# Patient Record
Sex: FEMALE | Race: WHITE | Employment: PART TIME | URBAN - METROPOLITAN AREA
[De-identification: names, ages, dates, MRNs, and addresses within clinical notes are randomized per-mention and may not be internally consistent; named-entity substitution may affect disease eponyms.]

---

## 2017-01-07 PROBLEM — N92.0 MENORRHAGIA WITH REGULAR CYCLE: Status: ACTIVE | Noted: 2017-01-07

## 2017-03-06 ENCOUNTER — OFFICE VISIT (OUTPATIENT)
Dept: FAMILY MEDICINE | Facility: CLINIC | Age: 42
End: 2017-03-06
Payer: COMMERCIAL

## 2017-03-06 VITALS
BODY MASS INDEX: 23.14 KG/M2 | HEIGHT: 66 IN | TEMPERATURE: 98.2 F | DIASTOLIC BLOOD PRESSURE: 64 MMHG | OXYGEN SATURATION: 100 % | HEART RATE: 64 BPM | SYSTOLIC BLOOD PRESSURE: 112 MMHG | WEIGHT: 144 LBS | RESPIRATION RATE: 10 BRPM

## 2017-03-06 DIAGNOSIS — J45.20 INTERMITTENT ASTHMA, UNCOMPLICATED: ICD-10-CM

## 2017-03-06 DIAGNOSIS — R10.2 PELVIC PAIN IN FEMALE: ICD-10-CM

## 2017-03-06 DIAGNOSIS — R39.89 URINARY PROBLEM: Primary | ICD-10-CM

## 2017-03-06 DIAGNOSIS — K62.89 RECTAL PAIN: ICD-10-CM

## 2017-03-06 LAB
ALBUMIN UR-MCNC: NEGATIVE MG/DL
APPEARANCE UR: CLEAR
BILIRUB UR QL STRIP: NEGATIVE
COLOR UR AUTO: YELLOW
GLUCOSE UR STRIP-MCNC: NEGATIVE MG/DL
HGB UR QL STRIP: NEGATIVE
KETONES UR STRIP-MCNC: NEGATIVE MG/DL
LEUKOCYTE ESTERASE UR QL STRIP: NEGATIVE
MICRO REPORT STATUS: NORMAL
NITRATE UR QL: NEGATIVE
PH UR STRIP: 6 PH (ref 5–7)
SP GR UR STRIP: 1.01 (ref 1–1.03)
SPECIMEN SOURCE: NORMAL
URN SPEC COLLECT METH UR: NORMAL
UROBILINOGEN UR STRIP-ACNC: 0.2 EU/DL (ref 0.2–1)
WET PREP SPEC: NORMAL

## 2017-03-06 PROCEDURE — 81003 URINALYSIS AUTO W/O SCOPE: CPT | Performed by: NURSE PRACTITIONER

## 2017-03-06 PROCEDURE — 87210 SMEAR WET MOUNT SALINE/INK: CPT | Performed by: NURSE PRACTITIONER

## 2017-03-06 PROCEDURE — 99214 OFFICE O/P EST MOD 30 MIN: CPT | Performed by: NURSE PRACTITIONER

## 2017-03-06 NOTE — MR AVS SNAPSHOT
After Visit Summary   3/6/2017    Janelle Alston    MRN: [id number]           Patient Information     Date Of Birth          1975        Visit Information        Provider Department      3/6/2017 11:00 AM Haase, Susan Rachele, APRN CNP [facility]        Today's Diagnoses     Urinary problem    -  1    Pelvic pain in female        Rectal pain        Intermittent asthma, uncomplicated           Follow-ups after your visit        Follow-up notes from your care team     Return if symptoms worsen or fail to improve.      Future tests that were ordered for you today     Open Future Orders        Priority Expected Expires Ordered    US Pelvic Complete w Transvaginal Routine 6/4/2017 3/6/2018 3/6/2017            Who to contact     If you have questions or need follow up information about today's clinic visit or your schedule please contact [facility] directly at [phone].  Normal or non-critical lab and imaging results will be communicated to you by [employer]Xtreme[employer], letter or phone within 4 business days after the clinic has received the results. If you do not hear from us within 7 days, please contact the clinic through ScaleXtremehart or phone. If you have a critical or abnormal lab result, we will notify you by phone as soon as possible.  Submit refill requests through [employer] or call your pharmacy and they will forward the refill request to us. Please allow 3 business days for your refill to be completed.          Additional Information About Your Visit        MyChart Information     [employer] gives you secure access to your electronic health record. If you see a primary care provider, you can also send messages to your care team and make appointments. If you have questions, please call your primary care clinic.  If you do not have a primary care provider, please call [phone] and they will assist you.        Care EveryWhere ID     This is your Care EveryWhere ID. This could  "be used by other organizations to access your [place] medical records  [id number]        Your Vitals Were     Pulse Temperature Respirations Height Pulse Oximetry BMI (Body Mass Index)    64 98.2  F (36.8  C) (Oral) 10 5' 6\" (1.676 m) 100% 23.24 kg/m2       Blood Pressure from Last 3 Encounters:   03/06/17 112/64   12/20/16 120/66   12/16/16 102/68    Weight from Last 3 Encounters:   03/06/17 144 lb (65.3 kg)   12/16/16 139 lb (63 kg)   12/15/16 141 lb 9.6 oz (64.2 kg)              We Performed the Following     *UA reflex to Microscopic and Culture ([facility], [place] and [facility] (except [place] and [place])     Asthma Action Plan (AAP)     Wet prep        Primary Care Provider Office Phone # Fax #    Ramona Ann Aaseby-Aguilera, PA-C [phone]       [facility] [address] [address] [facility] [address]        Thank you!     Thank you for choosing [facility]  for your care. Our goal is always to provide you with excellent care. Hearing back from our patients is one way we can continue to improve our services. Please take a few minutes to complete the written survey that you may receive in the mail after your visit with us. Thank you!             Your Updated Medication List - Protect others around you: Learn how to safely use, store and throw away your medicines at www.disposemymeds.org.          This list is accurate as of: 3/6/17 11:40 AM.  Always use your most recent med list.                   Brand Name Dispense Instructions for use    acidophilus Caps      2 tabs per day of an UNK dose       ALBUTEROL INHALER 17GM      PRN       ALLEGRA 180 MG tablet   Generic drug:  fexofenadine      Take by mouth daily Twice a day       B Complex Vitamins Soln          calcium carbonate 500 MG tablet    OS- mg [place]. Ca     2 tab in the am       cholecalciferol 1000 UNIT tablet    vitamin D     1 TABLET DAILY or PRN       coenzyme Q-10 100 MG Caps     30 Cap    " 2 tab at night       * EPINEPHrine 0.3 MG/0.3ML injection    EPIPEN 2-ANNEMARIE    1 each    Inject 0.3 mLs into the muscle once as needed for anaphylaxis for 1 dose.       * EPINEPHrine 0.3 MG/0.3ML injection    EPIPEN 2-ANNEMARIE    0.6 mL    Inject 0.3 mLs (0.3 mg) into the muscle once as needed for anaphylaxis       fluticasone 50 MCG/ACT spray    FLONASE    16 g    Spray 1-2 sprays into both nostrils daily       IRON SUPPLEMENT PO      Take 325 mg by mouth daily       Magnesium 400 MG Caps      Take 2 tablets by mouth.       Multi-vitamin Tabs tablet   Generic drug:  multivitamin, therapeutic with minerals      ONE TABLET DAILY x 2x per day       omega 3 1000 MG Caps      3 CAPSULES DAILY WITH A MEAL       oxyCODONE 5 MG IR tablet    ROXICODONE    18 tablet    Take 1 tablet (5 mg) by mouth every 4 hours as needed for pain maximum 6 tablet(s) per day       vitamin C 100 MG Chew      1 TABLET 3 TIMES DAILY       * Notice:  This list has 2 medication(s) that are the same as other medications prescribed for you. Read the directions carefully, and ask your doctor or other care provider to review them with you.

## 2017-03-06 NOTE — NURSING NOTE
"Chief Complaint   Patient presents with     Urinary Problem       Initial /64 (BP Location: Right arm, Patient Position: Chair, Cuff Size: Adult Regular)  Pulse 64  Temp 98.2  F (36.8  C) (Oral)  Resp 10  Ht 5' 6\" (1.676 m)  Wt 144 lb (65.3 kg)  SpO2 100%  BMI 23.24 kg/m2 Estimated body mass index is 23.24 kg/(m^2) as calculated from the following:    Height as of this encounter: 5' 6\" (1.676 m).    Weight as of this encounter: 144 lb (65.3 kg).  Medication Reconciliation: complete   Marleni Allen CMA      "

## 2017-03-06 NOTE — PROGRESS NOTES
SUBJECTIVE:                                                    Janelle Alston is a 41 year old female who presents to clinic today for the following health issues:    URINARY TRACT SYMPTOMS      Duration: 2 weeks    Description  frequency    Accompanying signs and symptoms:  Fever/chills: no  Flank pain no   Nausea and vomiting: YES  Vaginal symptoms: discharge and itching  Abdominal/Pelvic Pain: YES    History  History of frequent UTI's: YES  History of kidney stones: no   Sexually Active: YES  Possibility of pregnancy: No    Precipitating or alleviating factors: None    Therapies tried and outcome: ibuprofen       History of ablation, no vaginal bleeding.      Janelle reports rectal pain w/ burning sensation and severe discomfort. Rectal pain is constant during the day and worse when standing. Symptoms are improved when asleep. She recently has a massage which provided some relief. Has a hx of internal hemorrhoids and removal. Has used lavender and coconut oil for relief. Denies hematochezia, rectal itching, constipation or abdominal pain.     Has colorectal appt in 2 weeks.    Problem list and histories reviewed & adjusted, as indicated.  Additional history: as documented    Patient Active Problem List   Diagnosis     Intermittent asthma     CARDIOVASCULAR SCREENING; LDL GOAL LESS THAN 160     LLQ abdominal pain     Hepatic adenoma     Allergic rhinitis     Menorrhagia with regular cycle     Past Surgical History   Procedure Laterality Date     C  delivery only        and      Surgical history of -        laproscopy -abd-for endometriosis     Hc tooth extraction w/forcep       Abdominoplasty       Dilation and curettage, hysteroscopy, ablate endometrium novasure, combined N/A 2016     Procedure: COMBINED DILATION AND CURETTAGE, HYSTEROSCOPY, ABLATE ENDOMETRIUM NOVASURE;  Surgeon: Isak Eaton MD;  Location:  OR       Social History   Substance Use Topics     Smoking status:  Never Smoker     Smokeless tobacco: Never Used     Alcohol use No     Family History   Problem Relation Age of Onset     Family History Negative Mother      Family History Negative Father      DIABETES Other      paternal greatgrandmother     Family History Negative Brother      1     CANCER Maternal Grandmother      ? kidney cancer         Current Outpatient Prescriptions   Medication Sig Dispense Refill     EPINEPHrine (EPIPEN 2-ANNEMARIE) 0.3 MG/0.3ML injection Inject 0.3 mLs (0.3 mg) into the muscle once as needed for anaphylaxis 0.6 mL 1     Ferrous Sulfate (IRON SUPPLEMENT PO) Take 325 mg by mouth daily       B Complex Vitamins SOLN        fluticasone (FLONASE) 50 MCG/ACT nasal spray Spray 1-2 sprays into both nostrils daily 16 g 11     fexofenadine (ALLEGRA) 180 MG tablet Take by mouth daily Twice a day       Magnesium 400 MG CAPS Take 2 tablets by mouth.       EPINEPHrine (EPIPEN 2-ANNEMARIE) 0.3 MG/0.3ML injection Inject 0.3 mLs into the muscle once as needed for anaphylaxis for 1 dose. 1 each 0     MULTI-VITAMIN PO TABS ONE TABLET DAILY x 2x per day       VITAMIN C 100 MG PO CHEW 1 TABLET 3 TIMES DAILY       VITAMIN D 1000 UNIT PO TABS 1 TABLET DAILY or PRN       CALCIUM 500 MG PO TABS 2 tab in the am       CO Q-10 100 MG PO CAPS 2 tab at night 30 Cap      ACIDOPHILUS OR CAPS 2 tabs per day of an UNK dose       OMEGA 3 1000 MG PO CAPS 3 CAPSULES DAILY WITH A MEAL       ALBUTEROL INHALER 17GM PRN       oxyCODONE (ROXICODONE) 5 MG IR tablet Take 1 tablet (5 mg) by mouth every 4 hours as needed for pain maximum 6 tablet(s) per day (Patient not taking: Reported on 3/6/2017) 18 tablet 0     Allergies   Allergen Reactions     Amoxicillin Rash     Ciproflox [Ciprofloxacin] Nausea and Vomiting     Clindamycin Rash     Erythromycin      rash     Penicillins Rash     Sulfa Drugs Rash     Tetracycline Rash     Tylenol [Acetaminophen] Itching       Reviewed and updated as needed this visit by clinical staff       Reviewed and  "updated as needed this visit by Provider         ROS:  Constitutional, HEENT, cardiovascular, pulmonary, GI, , musculoskeletal, neuro, skin, endocrine and psych systems are negative, except as otherwise noted.    This document serves as a record of the services and decisions personally performed and made by Susan Haase, CNP. It was created on her behalf by Angeli Domínguez, a trained medical scribe. The creation of this document is based the provider's statements to the medical scribe.  Angeli Domínguez March 6, 2017 11:26 AM     OBJECTIVE:                                                    /64 (BP Location: Right arm, Patient Position: Chair, Cuff Size: Adult Regular)  Pulse 64  Temp 98.2  F (36.8  C) (Oral)  Resp 10  Ht 1.676 m (5' 6\")  Wt 65.3 kg (144 lb)  SpO2 100%  BMI 23.24 kg/m2  Body mass index is 23.24 kg/(m^2).  GENERAL: healthy, alert and no distress  RESP: lungs clear to auscultation - no rales, rhonchi or wheezes  CV: regular rate and rhythm, normal S1 S2, no S3 or S4, no murmur, click or rub, no peripheral edema   ABDOMEN: soft, nontender, no hepatosplenomegaly, no masses and bowel sounds normal  RECTUM:  Rectum without external hemorrhoid or erythema/edema, small skin tag, tender to touch.  Internal exam deferred.   PSYCH: mentation appears normal, affect normal/bright  Results for orders placed or performed in visit on 03/06/17   *UA reflex to Microscopic and Culture ([facility] (except [place] and [place])   Result Value Ref Range    Color Urine Yellow     Appearance Urine Clear     Glucose Urine Negative NEG mg/dL    Bilirubin Urine Negative NEG    Ketones Urine Negative NEG mg/dL    Specific Gravity Urine 1.015 1.003 - 1.035    Blood Urine Negative NEG    pH Urine 6.0 5.0 - 7.0 pH    Protein Albumin Urine Negative NEG mg/dL    Urobilinogen Urine 0.2 0.2 - 1.0 EU/dL    Nitrite Urine Negative NEG    Leukocyte Esterase Urine Negative NEG    Source Midstream " Urine    Wet prep   Result Value Ref Range    Specimen Description Vagina     Wet Prep       No Trichomonas seen  No clue cells seen  No yeast seen      Micro Report Status FINAL 03/06/2017         ASSESSMENT/PLAN:                                                    Janelle was seen today for urinary problem.    Diagnoses and all orders for this visit:    Urinary problem:  UA and wet prep with normal results, see above, patient informed.   -     *UA reflex to Microscopic and Culture ([facility], [place] and [facility] (except [place] and [place])  -     Wet prep    Pelvic pain in female  -     US Pelvic Complete w Transvaginal; Future    Rectal pain: external exam is normal, has an appt with colo rectal in 2 weeks.      The information in this document, created by the medical scribe for me, accurately reflects the services I personally performed and the decisions made by me. I have reviewed and approved this document for accuracy.   Susan Haase, CNP   Follow up as needed if symptoms get worse or do not improve.   Susan Haase, APRN CNP  [facility]

## 2017-12-12 ENCOUNTER — THERAPY VISIT (OUTPATIENT)
Dept: PHYSICAL THERAPY | Facility: CLINIC | Age: 42
End: 2017-12-12
Payer: COMMERCIAL

## 2017-12-12 DIAGNOSIS — M54.50 LUMBAGO: ICD-10-CM

## 2017-12-12 DIAGNOSIS — R10.2 PELVIC PAIN IN FEMALE: Primary | ICD-10-CM

## 2017-12-12 PROCEDURE — 97535 SELF CARE MNGMENT TRAINING: CPT | Mod: GP | Performed by: PHYSICAL THERAPIST

## 2017-12-12 PROCEDURE — 97161 PT EVAL LOW COMPLEX 20 MIN: CPT | Mod: GP | Performed by: PHYSICAL THERAPIST

## 2017-12-12 PROCEDURE — 97110 THERAPEUTIC EXERCISES: CPT | Mod: GP | Performed by: PHYSICAL THERAPIST

## 2017-12-12 NOTE — LETTER
"[facility]  [address]  [address]  [place] [facility] [address]  [phone]    2017    Re: Janelle Alston   :   1975  MRN:  [id number]   REFERRING PHYSICIAN:   Nelson MARIO PT    Date of Initial Evaluation:  2017  Visits:  Rxs Used: 1  Reason for Referral:     Pelvic pain in female  Lumbago    EVALUATION SUMMARY    Subjective:    Patient is a 42 year old female presenting with rehab pelvic hpi. The history is provided by the patient. No  was used.   Janelle Alston is a 42 year old female with a pelvic dysfunction (LBP) condition.  Condition occurred with:  Insidious onset.  Condition occurred: for unknown reasons.  This is a chronic condition  Onset of pelvic floor dysfunction ~ 1-2 years ago. Noticed had urgency/frequency/difficulty urinating/ incomplete emptying/painful intercourse and tampon use. Developed anal fissure as well (8 mos ago). , 2 c-sections (12 years ago). Up at night occasionally to void but able to ignore. Also able to ignore urge during day for the most part. Had severe pain with periods/tampon use so had ablation Dec 2016. Pain in rectal/perineum from fissure. Uses heating pad after BM b/c pain occurs in fissure area about 1 hour after BM. Denies any pain with BM or hard stool. [place] to  and upper abdominal also helps. Goal is to get rid of pain, and urgency, have intercourse with less pain.     LBP across  For 10+ year history. Sees chiropractor regularly. 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 MRI recently showed L4-5 annual tear and L5-S1 DDD. Feels really tight. WORSE with walking, bending.  BETTER with laying on back, foam roller, chiro, traction, Graston..    Patient reports pain:  Lumbar spine right, lumbar spine left, vaginal and coccyx.    Pain is described as aching and sharp and is constant and intermittent and reported as 4/10.        Since onset symptoms are gradually " worsening.        General health as reported by patient is excellent.  Pertinent medical history includes:  Asthma.  Medical allergies: yes (see EPIC).  Other surgeries include:  None.  Current medications:  Anti-inflammatory and pain medication.  Current occupation is /seamstress/dance.      Objective:  Standing Alignment:    Lumbar:  Lordosis decr  Pelvic:  PSIS high L  General deviations alignment: L anterior innominate rotation in supine, leg length appears = B in supine and long sit.     Gait:  Slight R lateral shift with gait        Re: Janelle Alston   :   1975       Lumbar/SI Evaluation  ROM:    AROM Lumbar:   Flexion:        To mid-thigh, max loss flexion, uses hips to hinge and hands to return to neutral.  Ext:                    Max loss, central LBP   Side Bend:        Left:     Right:   Rotation:           Left:     Right:   Side Glide:        Left:     Right:     Strength: Prone press-ups decreased central LBP after 3 reps.   Lumbar Myotomes:  normal  Lumbar Dermtomes:  not assessed  Neural Tension/Mobility:  Lumbar:  Normal    Lumbar Palpation:    Tenderness present at Left:    Quadratus Lumborum and Erector Spinae  Tenderness present at Right: Quadratus Lumborum and Erector Spinae  SI joint/Sacrum:      Left negative at:    Squish  Right negative at:  Squish  Sacral conclusion left:  Anterior inominate  Sacral conclusion right:  Posterior inominate       Pelvic Dysfunction Evaluation:    Bladder/Pelvic Problems:    Storage Problem:  Urgency and frequency  Emptying Problem:  Incomplete emptying, hesitancy and dyspareunia  Dyspareunia:  Grade 2    Diagnostic Tests:    Pelvic Exam:  Yes, ~ 1 year ago, painful  Flexibility:    Tightness present at:Adductors; Iliopsoas; Hamstrings and Piriformis  Abdominal Wall:  Abdominal wall pelvic: will check next.  Pelvic Clock Exam:  Pelvic clock exam: OI L>R pain +++  Ischiocavernosis pain:  -  Bulbocavernosis pain:  -  Transverse Perineal:   -  Levator ANI:  +++  Perineal Body:  -  Reflex Testing:  normal  External Assessment:    Bearing Down/Coughing:  Normal  Tissue Symmetry:  Normal  Introitus:  Normal  Muscle Contraction/Perineal Mobility:  Slight lift, no urogential triangle descent  Internal Assessment:    Contraction/Grade:  Weak squeeze, 2 second hold (2)  SEMG Biofeedback:  NA  Additional History:  Delivery History:    Number of Pregnancies: 3  Number of Live Births: 2    Hip Evaluation  Hip PROM:  Hip PROM:  Left Hip:    Normal  Right Hip:  Normal        Re: Janelle Alston   :   1975    Assessment/Plan:      Patient is a 42 year old female with lumbar and pelvic complaints.    Patient has the following significant findings with corresponding treatment plan.                Diagnosis 1:  Pelvic pain and LBP  Pain -  hot/cold therapy, manual therapy, splint/taping/bracing/orthotics, self management, education, directional preference exercise and home program  Decreased ROM/flexibility - manual therapy and therapeutic exercise  Decreased joint mobility - manual therapy and therapeutic exercise  Decreased strength - therapeutic exercise and therapeutic activities  Decreased proprioception - neuro re-education and therapeutic activities  Inflammation - cold therapy and self management/home program  Impaired gait - gait training  Impaired muscle performance - neuro re-education  Decreased function - therapeutic activities  Impaired posture - neuro re-education    Therapy Evaluation Codes:   1) History comprised of:   Personal factors that impact the plan of care:      None.    Comorbidity factors that impact the plan of care are:      None.     Medications impacting care: None.  2) Examination of Body Systems comprised of:   Body structures and functions that impact the plan of care:      Lumbar spine and Pelvis.   Activity limitations that impact the plan of care are:      Bending, Walking, Frequency, Pelvic Exam, [place],  Urgency and Using a tampon.  3) Clinical presentation characteristics are:   Stable/Uncomplicated.  4) Decision-Making    Low complexity using standardized patient assessment instrument and/or measureable assessment of functional outcome.  Cumulative Therapy Evaluation is: Low complexity.    Previous and current functional limitations:  (See Goal Flow Sheet for this information)    Short term and Long term goals: (See Goal Flow Sheet for this information)     Communication ability:  Patient appears to be able to clearly communicate and understand verbal and written communication and follow directions correctly.  Treatment Explanation - The following has been discussed with the patient:   RX ordered/plan of care  Anticipated outcomes  Possible risks and side effects                      Re: Janelle Alston   :   1975    This patient would benefit from PT intervention to resume normal activities.   Rehab potential is excellent.    Frequency:  1 X week, once daily  Duration:  for 6 weeks  Discharge Plan:  Achieve all LTG.  Independent in home treatment program.  Reach maximal therapeutic benefit.    Thank you for your referral.    INQUIRIES  Therapist: Janay Kelly, MS, PT, OCS  [facility]  [address]  [address] [place] [address]  Phone: [phone]  Fax: [phone]

## 2017-12-12 NOTE — MR AVS SNAPSHOT
After Visit Summary   12/12/2017    Janelle Alston    MRN: [id number]           Patient Information     Date Of Birth          1975        Visit Information        Provider Department      12/12/2017 1:35 PM Janay Kelly, PT [facility]        Today's Diagnoses     Pelvic pain in female    -  1    Lumbago           Follow-ups after your visit        Your next 10 appointments already scheduled     Dec 19, 2017  4:15 PM CST   [facility] For Women Only with Janay Kelly, PT   [facility] ([facility] [place]  )    [address] [address] [address]   [phone]            Dec 26, 2017 11:30 AM CST   [facility] For Women Only with Ania Guerrero PT   [facility] ([facility])    [address] [facility] [address]   [phone]            Jan 05, 2018 11:45 AM CST   [facility] Women Only with Janay Kelly, PT   [facility] ([facility] [place]  )    [address] [address]   [phone]            Pb 10, 2018  1:10 PM CST   [facility] For Women Only with Janay Divine Guerrero, PT   [facility] ([facility] [place]  )    [address] [address] [address]   [phone]              Who to contact     If you have questions or need follow up information about today's clinic visit or your schedule please contact [facility] directly at [phone].  Normal or non-critical lab and imaging results will be communicated to you by Secret Labhart, letter or phone within 4 business days after the clinic has received the results. If you do not hear from us within 7 days, please contact the clinic through Secret Labhart or phone. If you have a critical or abnormal lab result, we will notify you by phone as soon as possible.  Submit refill requests through Ziarco or call your pharmacy and they will forward the refill request to us. Please allow 3 business days for your refill to be completed.           Additional Information About Your Visit        MyChart Information     SecureAuth gives you secure access to your electronic health record. If you see a primary care provider, you can also send messages to your care team and make appointments. If you have questions, please call your primary care clinic.  If you do not have a primary care provider, please call [phone] and they will assist you.        Care EveryWhere ID     This is your Care EveryWhere ID. This could be used by other organizations to access your [place] medical records  [id number]         Blood Pressure from Last 3 Encounters:   03/06/17 112/64   12/20/16 120/66   12/16/16 102/68    Weight from Last 3 Encounters:   03/06/17 65.3 kg (144 lb)   12/16/16 63 kg (139 lb)   12/15/16 64.2 kg (141 lb 9.6 oz)              We Performed the Following     HC PT EVAL, LOW COMPLEXITY     [facility] INITIAL EVAL REPORT     SELF CARE MNGMENT TRAINING     THERAPEUTIC EXERCISES        Primary Care Provider Office Phone # Fax #    Klarissa Ann Aaseby-Aguilera, PA-C [phone] [address] [address]        Equal Access to Services     JULIETA AGOSTO : Hadii aad ku hadasho Soomaali, waaxda luqadaha, qaybta kaalmada adeegyada, waxay idiin haysarahin adeeg carol lahellen ah. So [facility] [phone].    ATENCIÓN: Si habla español, tiene a isngh disposición servicios gratuitos de asistencia lingüística. Llame al [phone].    We comply with applicable federal civil rights laws and [place] laws. We do not discriminate on the basis of race, color, national origin, age, disability, sex, sexual orientation, or gender identity.            Thank you!     Thank you for choosing [facility]  for your care. Our goal is always to provide you with excellent care. Hearing back from our patients is one way we can continue to improve our services. Please take a few minutes to complete the written survey that you may receive in the mail after your  visit with us. Thank you!             Your Updated Medication List - Protect others around you: Learn how to safely use, store and throw away your medicines at www.disposemymeds.org.          This list is accurate as of: 12/12/17 11:59 PM.  Always use your most recent med list.                   Brand Name Dispense Instructions for use Diagnosis    acidophilus Caps      2 tabs per day of an UNK dose        ALBUTEROL INHALER 17GM      PRN        ALLEGRA 180 MG tablet   Generic drug:  fexofenadine      Take by mouth daily Twice a day        B Complex Vitamins Soln           calcium carbonate 1250 MG tablet    OS- mg [place]. Ca     2 tab in the am        cholecalciferol 1000 UNIT tablet    vitamin D3     1 TABLET DAILY or PRN        coenzyme Q-10 100 MG Caps     30 Cap    2 tab at night        * EPINEPHrine 0.3 MG/0.3ML injection    EPIPEN 2-ANNEMARIE    1 each    Inject 0.3 mLs into the muscle once as needed for anaphylaxis for 1 dose.    Allergies       * EPINEPHrine 0.3 MG/0.3ML injection 2-pack    EPIPEN 2-ANNEMARIE    0.6 mL    Inject 0.3 mLs (0.3 mg) into the muscle once as needed for anaphylaxis    Hx of bee sting allergy       fluticasone 50 MCG/ACT spray    FLONASE    16 g    Spray 1-2 sprays into both nostrils daily    Nasal polyp       IRON SUPPLEMENT PO      Take 325 mg by mouth daily        Magnesium 400 MG Caps      Take 2 tablets by mouth.        Multi-vitamin Tabs tablet   Generic drug:  multivitamin, therapeutic with minerals      ONE TABLET DAILY x 2x per day        omega 3 1000 MG Caps      3 CAPSULES DAILY WITH A MEAL        oxyCODONE IR 5 MG tablet    ROXICODONE    18 tablet    Take 1 tablet (5 mg) by mouth every 4 hours as needed for pain maximum 6 tablet(s) per day    Postoperative pain       vitamin C 100 MG Chew      1 TABLET 3 TIMES DAILY        * Notice:  This list has 2 medication(s) that are the same as other medications prescribed for you. Read the directions carefully, and ask your doctor or  other care provider to review them with you.

## 2017-12-13 PROBLEM — M54.50 LUMBAGO: Status: ACTIVE | Noted: 2017-12-13

## 2017-12-19 ENCOUNTER — THERAPY VISIT (OUTPATIENT)
Dept: PHYSICAL THERAPY | Facility: CLINIC | Age: 42
End: 2017-12-19
Payer: COMMERCIAL

## 2017-12-19 DIAGNOSIS — M54.50 LUMBAGO: ICD-10-CM

## 2017-12-19 DIAGNOSIS — R10.2 PELVIC PAIN IN FEMALE: ICD-10-CM

## 2017-12-19 PROCEDURE — 97112 NEUROMUSCULAR REEDUCATION: CPT | Mod: GP | Performed by: PHYSICAL THERAPIST

## 2017-12-19 PROCEDURE — 97535 SELF CARE MNGMENT TRAINING: CPT | Mod: GP | Performed by: PHYSICAL THERAPIST

## 2017-12-19 PROCEDURE — 97140 MANUAL THERAPY 1/> REGIONS: CPT | Mod: GP | Performed by: PHYSICAL THERAPIST

## 2017-12-20 ENCOUNTER — TRANSFERRED RECORDS (OUTPATIENT)
Dept: HEALTH INFORMATION MANAGEMENT | Facility: CLINIC | Age: 42
End: 2017-12-20

## 2017-12-26 ENCOUNTER — THERAPY VISIT (OUTPATIENT)
Dept: PHYSICAL THERAPY | Facility: CLINIC | Age: 42
End: 2017-12-26
Payer: COMMERCIAL

## 2017-12-26 DIAGNOSIS — R10.2 PELVIC PAIN IN FEMALE: ICD-10-CM

## 2017-12-26 DIAGNOSIS — M54.50 LUMBAGO: ICD-10-CM

## 2017-12-26 PROCEDURE — 97140 MANUAL THERAPY 1/> REGIONS: CPT | Mod: GP | Performed by: PHYSICAL THERAPIST

## 2017-12-26 PROCEDURE — 97112 NEUROMUSCULAR REEDUCATION: CPT | Mod: GP | Performed by: PHYSICAL THERAPIST

## 2018-01-05 ENCOUNTER — THERAPY VISIT (OUTPATIENT)
Dept: PHYSICAL THERAPY | Facility: CLINIC | Age: 43
End: 2018-01-05
Payer: COMMERCIAL

## 2018-01-05 DIAGNOSIS — R10.2 PELVIC PAIN IN FEMALE: ICD-10-CM

## 2018-01-05 DIAGNOSIS — M54.50 LUMBAGO: ICD-10-CM

## 2018-01-05 PROCEDURE — 97140 MANUAL THERAPY 1/> REGIONS: CPT | Mod: GP | Performed by: PHYSICAL THERAPIST

## 2018-01-05 PROCEDURE — 97110 THERAPEUTIC EXERCISES: CPT | Mod: GP | Performed by: PHYSICAL THERAPIST

## 2018-01-10 ENCOUNTER — THERAPY VISIT (OUTPATIENT)
Dept: PHYSICAL THERAPY | Facility: CLINIC | Age: 43
End: 2018-01-10
Payer: COMMERCIAL

## 2018-01-10 DIAGNOSIS — R10.2 PELVIC PAIN IN FEMALE: ICD-10-CM

## 2018-01-10 DIAGNOSIS — M54.50 LUMBAGO: ICD-10-CM

## 2018-01-10 PROCEDURE — 97535 SELF CARE MNGMENT TRAINING: CPT | Mod: GP | Performed by: PHYSICAL THERAPIST

## 2018-01-10 PROCEDURE — 97140 MANUAL THERAPY 1/> REGIONS: CPT | Mod: GP | Performed by: PHYSICAL THERAPIST

## 2018-01-15 ENCOUNTER — TELEPHONE (OUTPATIENT)
Dept: FAMILY MEDICINE | Facility: CLINIC | Age: 43
End: 2018-01-15

## 2018-01-15 NOTE — LETTER
[facility]  [address] [place] [id number] [phone]  January 22, 2018    Janelle Alston  [address] [facility] [address]    Dear Janelle,    I care about your health and have reviewed your health plan. I have reviewed your medical conditions, medication list, and lab results and am making recommendations based on this review, to better manage your health.    You are in particular need of attention regarding:  -Asthma    I am recommending that you:  {recommendations: -Complete and return the attached ASTHMA CONTROL TEST.  If your total score is 19 or less or you have been to the ER or urgent care for your asthma, then please schedule an asthma followup appointment.    Here is a list of Health Maintenance topics that are due now or due soon:  Health Maintenance Due   Topic Date Due     ASTHMA CONTROL TEST Q6 MOS  06/16/2017     INFLUENZA VACCINE (SYSTEM ASSIGNED)  09/01/2017       Please call us at [phone] (or use [employer]) to address the above recommendations.     Thank you for trusting [facility] and we appreciate the opportunity to serve you.  We look forward to supporting your healthcare needs in the future.    Healthy Regards,    Susan Haase CNP

## 2018-01-15 NOTE — TELEPHONE ENCOUNTER
Panel Management Review      Patient has the following on her problem list:     Asthma review     ACT Total Scores 12/16/2016   ACT TOTAL SCORE -   ASTHMA ER VISITS -   ASTHMA HOSPITALIZATIONS -   ACT TOTAL SCORE (Goal Greater than or Equal to 20) 22   In the past 12 months, how many times did you visit the emergency room for your asthma without being admitted to the hospital? 0   In the past 12 months, how many times were you hospitalized overnight because of your asthma? 0      1. Is Asthma diagnosis on the Problem List? Yes    2. Is Asthma listed on Health Maintenance? Yes    3. Patient is due for:  ACT        Composite cancer screening  Chart review shows that this patient is due/due soon for the following None  Summary:    Patient is due/failing the following:   ACT    Action needed:   Patient needs to do ACT.    Type of outreach:    Sent RealOps message.    Questions for provider review:    None                                                                                                                                    Marleni Allen, [facility]       Chart routed to Care Team .

## 2018-01-22 ENCOUNTER — OFFICE VISIT (OUTPATIENT)
Dept: FAMILY MEDICINE | Facility: CLINIC | Age: 43
End: 2018-01-22
Payer: COMMERCIAL

## 2018-01-22 VITALS
TEMPERATURE: 98.5 F | WEIGHT: 140.87 LBS | RESPIRATION RATE: 16 BRPM | HEIGHT: 66 IN | HEART RATE: 63 BPM | BODY MASS INDEX: 22.64 KG/M2 | SYSTOLIC BLOOD PRESSURE: 100 MMHG | DIASTOLIC BLOOD PRESSURE: 60 MMHG | OXYGEN SATURATION: 98 %

## 2018-01-22 DIAGNOSIS — Z00.00 ROUTINE GENERAL MEDICAL EXAMINATION AT A HEALTH CARE FACILITY: ICD-10-CM

## 2018-01-22 DIAGNOSIS — Z13.220 LIPID SCREENING: ICD-10-CM

## 2018-01-22 DIAGNOSIS — Z13.0 SCREENING FOR DISORDER OF BLOOD AND BLOOD-FORMING ORGANS: ICD-10-CM

## 2018-01-22 DIAGNOSIS — Z23 NEED FOR PROPHYLACTIC VACCINATION AND INOCULATION AGAINST INFLUENZA: ICD-10-CM

## 2018-01-22 DIAGNOSIS — Z13.1 SCREENING FOR DIABETES MELLITUS: ICD-10-CM

## 2018-01-22 DIAGNOSIS — R10.2 PELVIC PAIN IN FEMALE: Primary | ICD-10-CM

## 2018-01-22 DIAGNOSIS — Z11.51 SCREENING FOR HUMAN PAPILLOMAVIRUS: ICD-10-CM

## 2018-01-22 PROCEDURE — 87624 HPV HI-RISK TYP POOLED RSLT: CPT | Performed by: PHYSICIAN ASSISTANT

## 2018-01-22 PROCEDURE — G0145 SCR C/V CYTO,THINLAYER,RESCR: HCPCS | Performed by: PHYSICIAN ASSISTANT

## 2018-01-22 PROCEDURE — 99396 PREV VISIT EST AGE 40-64: CPT | Performed by: PHYSICIAN ASSISTANT

## 2018-01-22 RX ORDER — VITAMIN E 268 MG
CAPSULE ORAL
COMMUNITY
Start: 2007-09-17

## 2018-01-22 NOTE — MR AVS SNAPSHOT
After Visit Summary   1/22/2018    Janelle Alston    MRN: [id number]           Patient Information     Date Of Birth          1975        Visit Information        Provider Department      1/22/2018 11:00 AM Aaseby-Aguilera, Ramona Ann, PA-C [facility]        Today's Diagnoses     Pelvic pain in female    -  1    Need for prophylactic vaccination and inoculation against influenza        Routine general medical examination at a health care facility        Screening for diabetes mellitus        Lipid screening        Screening for disorder of blood and blood-forming organs        Screening for human papillomavirus          Care Instructions      Preventive Health Recommendations  Female Ages 40 to 49    Yearly exam:     See your health care provider every year in order to  1. Review health changes.   2. Discuss preventive care.    3. Review your medicines if your doctor prescribed any.      Get a Pap test every three years (unless you have an abnormal result and your provider advises testing more often).      If you get Pap tests with HPV test, you only need to test every 5 years, unless you have an abnormal result. You do not need a Pap test if your uterus was removed (hysterectomy) and you have not had cancer.      You should be tested each year for STDs (sexually transmitted diseases), if you're at risk.       Ask your doctor if you should have a mammogram.      Have a colonoscopy (test for colon cancer) if someone in your family has had colon cancer or polyps before age 50.       Have a cholesterol test every 5 years.       Have a diabetes test (fasting glucose) after age 45. If you are at risk for diabetes, you should have this test every 3 years.    Shots: Get a flu shot each year. Get a tetanus shot every 10 years.     Nutrition:     Eat at least 5 servings of fruits and vegetables each day.    Eat whole-grain bread, whole-wheat pasta and brown rice instead of white grains and  rice.    Talk to your provider about Calcium and Vitamin D.     Lifestyle    Exercise at least 150 minutes a week (an average of 30 minutes a day, 5 days a week). This will help you control your weight and prevent disease.    Limit alcohol to one drink per day.    No smoking.     Wear sunscreen to prevent skin cancer.    See your dentist every six months for an exam and cleaning.          Follow-ups after your visit        Additional Services     OB/GYN REFERRAL       Your provider has referred you to:  [facility]: [facility] [phone]   [email or web address]   Dr. Lovelace    Please be aware that coverage of these services is subject to the terms and limitations of your health insurance plan.  Call member services at your health plan with any benefit or coverage questions.      Please bring the following with you to your appointment:    (1) Any X-Rays, CTs or MRIs which have been performed.  Contact the facility where they were done to arrange for  prior to your scheduled appointment.   (2) List of current medications   (3) This referral request   (4) Any documents/labs given to you for this referral                  Your next 10 appointments already scheduled     Jan 23, 2018  3:10 PM CST   [facility] For Women Only with Janay Kelly, PT   [facility] ([facility] [place]  )    [address] [id number]   [phone]              Future tests that were ordered for you today     Open Future Orders        Priority Expected Expires Ordered    CBC with platelets Routine  5/22/2018 1/22/2018    Comprehensive metabolic panel Routine  5/22/2018 1/22/2018    Lipid panel reflex to direct LDL Fasting Routine  5/22/2018 1/22/2018            Who to contact     If you have questions or need follow up information about today's clinic visit or your schedule please contact [facility] directly at [phone].  Normal  "or non-critical lab and imaging results will be communicated to you by MyChart, letter or phone within 4 business days after the clinic has received the results. If you do not hear from us within 7 days, please contact the clinic through SunnyBump or phone. If you have a critical or abnormal lab result, we will notify you by phone as soon as possible.  Submit refill requests through SunnyBump or call your pharmacy and they will forward the refill request to us. Please allow 3 business days for your refill to be completed.          Additional Information About Your Visit        Blue NilehargBox Information     SunnyBump gives you secure access to your electronic health record. If you see a primary care provider, you can also send messages to your care team and make appointments. If you have questions, please call your primary care clinic.  If you do not have a primary care provider, please call [phone] and they will assist you.        Care EveryWhere ID     This is your Care EveryWhere ID. This could be used by other organizations to access your [place] medical records  [id number]        Your Vitals Were     Pulse Temperature Respirations Height Pulse Oximetry Breastfeeding?    63 98.5  F (36.9  C) (Oral) 16 5' 6\" (1.676 m) 98% No    BMI (Body Mass Index)                   22.74 kg/m2            Blood Pressure from Last 3 Encounters:   01/22/18 100/60   03/06/17 112/64   12/20/16 120/66    Weight from Last 3 Encounters:   01/22/18 140 lb 13.9 oz (63.9 kg)   03/06/17 144 lb (65.3 kg)   12/16/16 139 lb (63 kg)              We Performed the Following     HPV High Risk Types DNA Cervical     OB/GYN REFERRAL     Pap imaged thin layer screen with HPV - recommended age 30 - 65 years (select HPV order below)        Primary Care Provider Office Phone # Fax #    Ramona Ann Aaseby-Aguilera, PA-C [phone] [address]  [facility] [address]        Equal Access to Services     ADELITA AGOSTO AH: Anne Marie miller " [place], wajackieda luqadaha, qaybta kaaldionicio cummings, hebert indigoin hayaan lettyagusto kayyoon laBrittanitoro rupali. So [facility] [phone].    ATENCIÓN: Si jass garcia, tiene a singh disposición servicios gratuitos de asistencia lingüística. Jess al [phone].    We comply with applicable federal civil rights laws and [place] laws. We do not discriminate on the basis of race, color, national origin, age, disability, sex, sexual orientation, or gender identity.            Thank you!     Thank you for choosing [facility]  for your care. Our goal is always to provide you with excellent care. Hearing back from our patients is one way we can continue to improve our services. Please take a few minutes to complete the written survey that you may receive in the mail after your visit with us. Thank you!             Your Updated Medication List - Protect others around you: Learn how to safely use, store and throw away your medicines at www.disposemymeds.org.          This list is accurate as of: 1/22/18 11:57 AM.  Always use your most recent med list.                   Brand Name Dispense Instructions for use Diagnosis    acidophilus Caps      2 tabs per day of an UNK dose        ALBUTEROL INHALER 17GM      PRN        ALLEGRA 180 MG tablet   Generic drug:  fexofenadine      Take by mouth daily Twice a day        B Complex Vitamins Soln           calcium carbonate 1250 MG tablet    OS- mg [place]. Ca     2 tab in the am        cholecalciferol 1000 UNIT tablet    vitamin D3     1 TABLET DAILY or PRN        coenzyme Q-10 100 MG Caps     30 Cap    2 tab at night        EPINEPHrine 0.3 MG/0.3ML injection 2-pack    EPIPEN 2-ANNEMARIE    0.6 mL    Inject 0.3 mLs (0.3 mg) into the muscle once as needed for anaphylaxis    Hx of bee sting allergy       flaxseed oil 1000 MG Caps      1 tsp daily        fluticasone 50 MCG/ACT spray    FLONASE    16 g    Spray 1-2 sprays into both nostrils daily    Nasal polyp       Magnesium 400 MG Caps       Take 2 tablets by mouth.        Multi-vitamin Tabs tablet   Generic drug:  multivitamin, therapeutic with minerals      ONE TABLET DAILY x 2x per day        omega 3 1000 MG Caps      3 CAPSULES DAILY WITH A MEAL        vitamin C 100 MG Chew      1 TABLET 3 TIMES DAILY

## 2018-01-22 NOTE — NURSING NOTE
"Chief Complaint   Patient presents with     Physical       Initial /60 (BP Location: Right arm, Patient Position: Chair, Cuff Size: Adult Regular)  Pulse 63  Temp 98.5  F (36.9  C) (Oral)  Resp 16  Ht 5' 6\" (1.676 m)  Wt 140 lb 13.9 oz (63.9 kg)  SpO2 98%  Breastfeeding? No  BMI 22.74 kg/m2 Estimated body mass index is 22.74 kg/(m^2) as calculated from the following:    Height as of this encounter: 5' 6\" (1.676 m).    Weight as of this encounter: 140 lb 13.9 oz (63.9 kg).  Medication Reconciliation: complete      Health Maintenance addressed:  Pap Smear and ACT    Possibly completing today per provider review.    HEMA Chen        "

## 2018-01-22 NOTE — PROGRESS NOTES
SUBJECTIVE:   CC: Janelle Alston is an 42 year old woman who presents for preventive health visit.     Healthy Habits:    Do you get at least three servings of calcium containing foods daily (dairy, green leafy vegetables, etc.)? no    Amount of exercise or daily activities, outside of work: 7 day(s) per week    Problems taking medications regularly No    Medication side effects: No    Have you had an eye exam in the past two years? yes    Do you see a dentist twice per year? yes    Do you have sleep apnea, excessive snoring or daytime drowsiness?no        Lump in vaginal area and discuss pelvic floor issues : doing pelvic floor therapy. Very tight.  Has a lot vaginal and rectal.  Is gong to [place] pt. X 5 week.   Has helped.      Today's PHQ-2 Score: PHQ-2 ( 1999 Pfizer) 1/22/2018 3/6/2017   Q1: Little interest or pleasure in doing things 0 0   Q2: Feeling down, depressed or hopeless 0 0   PHQ-2 Score 0 0         Abuse: Current or Past(Physical, Sexual or Emotional)- No  Do you feel safe in your environment - Yes  Social History   Substance Use Topics     Smoking status: Never Smoker     Smokeless tobacco: Never Used     Alcohol use No     If you drink alcohol do you typically have >3 drinks per day or >7 drinks per week? No                     Reviewed orders with patient.  Reviewed health maintenance and updated orders accordingly - Yes  BP Readings from Last 3 Encounters:   01/22/18 100/60   03/06/17 112/64   12/20/16 120/66    Wt Readings from Last 3 Encounters:   01/22/18 140 lb 13.9 oz (63.9 kg)   03/06/17 144 lb (65.3 kg)   12/16/16 139 lb (63 kg)                  Current Outpatient Prescriptions   Medication Sig Dispense Refill     Flaxseed, Linseed, (FLAXSEED OIL) 1000 MG CAPS 1 tsp daily       EPINEPHrine (EPIPEN 2-ANNEMARIE) 0.3 MG/0.3ML injection Inject 0.3 mLs (0.3 mg) into the muscle once as needed for anaphylaxis 0.6 mL 1     B Complex Vitamins SOLN        fluticasone (FLONASE) 50 MCG/ACT nasal spray  "Spray 1-2 sprays into both nostrils daily 16 g 11     fexofenadine (ALLEGRA) 180 MG tablet Take by mouth daily Twice a day       Magnesium 400 MG CAPS Take 2 tablets by mouth.       MULTI-VITAMIN PO TABS ONE TABLET DAILY x 2x per day       VITAMIN C 100 MG PO CHEW 1 TABLET 3 TIMES DAILY       VITAMIN D 1000 UNIT PO TABS 1 TABLET DAILY or PRN       CALCIUM 500 MG PO TABS 2 tab in the am       CO Q-10 100 MG PO CAPS 2 tab at night 30 Cap      ACIDOPHILUS OR CAPS 2 tabs per day of an UNK dose       OMEGA 3 1000 MG PO CAPS 3 CAPSULES DAILY WITH A MEAL       ALBUTEROL INHALER 17GM PRN           Patient under age 50, mutual decision reflected in health maintenance.        Pertinent mammograms are reviewed under the imaging tab.  History of abnormal Pap smear: NO - age 30- 65 PAP every 3 years recommended    Reviewed and updated as needed this visit by clinical staffTobacco  Meds  Med Hx  Surg Hx  Fam Hx  Soc Hx        Reviewed and updated as needed this visit by Provider            ROS:  C: NEGATIVE for fever, chills, change in weight  I: NEGATIVE for worrisome rashes, moles or lesions  E: NEGATIVE for vision changes or irritation  ENT: NEGATIVE for ear, mouth and throat problems  R: NEGATIVE for significant cough or SOB  B: NEGATIVE for masses, tenderness or discharge  CV: NEGATIVE for chest pain, palpitations or peripheral edema  GI: NEGATIVE for nausea, abdominal pain, heartburn, or change in bowel habits  : NEGATIVE for unusual urinary or vaginal symptoms. Periods are regular.  M: NEGATIVE for significant arthralgias or myalgia  N: NEGATIVE for weakness, dizziness or paresthesias  P: NEGATIVE for changes in mood or affect    OBJECTIVE:   /60 (BP Location: Right arm, Patient Position: Chair, Cuff Size: Adult Regular)  Pulse 63  Temp 98.5  F (36.9  C) (Oral)  Resp 16  Ht 5' 6\" (1.676 m)  Wt 140 lb 13.9 oz (63.9 kg)  SpO2 98%  Breastfeeding? No  BMI 22.74 kg/m2  EXAM:  GENERAL: healthy, alert and no " distress  EYES: Eyes grossly normal to inspection, PERRL and conjunctivae and sclerae normal  HENT: ear canals and TM's normal, nose and mouth without ulcers or lesions  NECK: no adenopathy, no asymmetry, masses, or scars and thyroid normal to palpation  RESP: lungs clear to auscultation - no rales, rhonchi or wheezes  BREAST: normal without masses, tenderness or nipple discharge and no palpable axillary masses or adenopathy  CV: regular rate and rhythm, normal S1 S2, no S3 or S4, no murmur, click or rub, no peripheral edema and peripheral pulses strong  ABDOMEN: soft, nontender, no hepatosplenomegaly, no masses and bowel sounds normal   (female): normal female external genitalia, normal urethral meatus, vaginal mucosa pink, moist, well rugated, and normal cervix/adnexa/uterus without masses or discharge  MS: no gross musculoskeletal defects noted, no edema  SKIN: no suspicious lesions or rashes  NEURO: Normal strength and tone, mentation intact and speech normal  PSYCH: mentation appears normal, affect normal/bright  LYMPH: no cervical, supraclavicular, axillary, or inguinal adenopathy    ASSESSMENT/PLAN:   1. Need for prophylactic vaccination and inoculation against influenza      2. Routine general medical examination at a health care facility      3. Pelvic pain in female  Has been to the [facility] for low back issues causing pelvic floor issues.  Currently going to PT.  Would like to readdress with Dr. Lovelace  - OB/GYN REFERRAL    4. Screening for diabetes mellitus    - Comprehensive metabolic panel; Future    5. Lipid screening    - Lipid panel reflex to direct LDL Fasting; Future    6. Screening for disorder of blood and blood-forming organs    - CBC with platelets; Future    7. Screening for human papillomavirus    - Pap imaged thin layer screen with HPV - recommended age 30 - 65 years (select HPV order below)  - HPV High Risk Types DNA Cervical    COUNSELING:   Reviewed preventive health counseling, as  "reflected in patient instructions       Regular exercise       Healthy diet/nutrition       Vision screening       Hearing screening         reports that she has never smoked. She has never used smokeless tobacco.    Estimated body mass index is 22.74 kg/(m^2) as calculated from the following:    Height as of this encounter: 5' 6\" (1.676 m).    Weight as of this encounter: 140 lb 13.9 oz (63.9 kg).         Counseling Resources:  ATP IV Guidelines  Pooled Cohorts Equation Calculator  Breast Cancer Risk Calculator  FRAX Risk Assessment  ICSI Preventive Guidelines  Dietary Guidelines for Americans, 2010  USDA's MyPlate  ASA Prophylaxis  Lung CA Screening    Ramona Ann Aaseby-Aguilera, PA-C  [facility]  "

## 2018-01-23 ENCOUNTER — THERAPY VISIT (OUTPATIENT)
Dept: PHYSICAL THERAPY | Facility: CLINIC | Age: 43
End: 2018-01-23
Payer: COMMERCIAL

## 2018-01-23 DIAGNOSIS — M54.50 LUMBAGO: ICD-10-CM

## 2018-01-23 DIAGNOSIS — R10.2 PELVIC PAIN IN FEMALE: ICD-10-CM

## 2018-01-23 PROCEDURE — 97140 MANUAL THERAPY 1/> REGIONS: CPT | Mod: GP | Performed by: PHYSICAL THERAPIST

## 2018-01-23 PROCEDURE — 97535 SELF CARE MNGMENT TRAINING: CPT | Mod: GP | Performed by: PHYSICAL THERAPIST

## 2018-01-23 ASSESSMENT — ASTHMA QUESTIONNAIRES: ACT_TOTALSCORE: 21

## 2018-01-23 NOTE — MR AVS SNAPSHOT
After Visit Summary   1/23/2018    Janelle Alston    MRN: [id number]           Patient Information     Date Of Birth          1975        Visit Information        Provider Department      1/23/2018 3:10 PM aJnay Kelly, PT [facility]        Today's Diagnoses     Pelvic pain in female        Lumbago           Follow-ups after your visit        Your next 10 appointments already scheduled     Feb 06, 2018  2:30 PM CST   [facility] For Women Only with Janay Kelly PT   [facility] ([facility]  )    [address] [address] [address]   [phone]            Feb 21, 2018  1:35 PM CST   [facility] For Women Only with Janay Kelly, PT   [facility] ([facility]  )    [address] [address] [address]   [phone]            Feb 22, 2018  3:15 PM CST   SHORT with Jay Lovelace MD   [facility] ([facility])    [address] [place] [address] [phone]              Who to contact     If you have questions or need follow up information about today's clinic visit or your schedule please contact [facility] directly at [phone].  Normal or non-critical lab and imaging results will be communicated to you by MyChart, letter or phone within 4 business days after the clinic has received the results. If you do not hear from us within 7 days, please contact the clinic through MyChart or phone. If you have a critical or abnormal lab result, we will notify you by phone as soon as possible.  Submit refill requests through Swapsee or call your pharmacy and they will forward the refill request to us. Please allow 3 business days for your refill to be completed.          Additional Information About Your Visit        [employer] Information     Swapsee gives you secure access to your electronic health record. If you see a primary care provider, you can also  send messages to your care team and make appointments. If you have questions, please call your primary care clinic.  If you do not have a primary care provider, please call [phone] and they will assist you.        Care EveryWhere ID     This is your Care EveryWhere ID. This could be used by other organizations to access your [place] medical records  [id number]         Blood Pressure from Last 3 Encounters:   01/22/18 100/60   03/06/17 112/64   12/20/16 120/66    Weight from Last 3 Encounters:   01/22/18 63.9 kg (140 lb 13.9 oz)   03/06/17 65.3 kg (144 lb)   12/16/16 63 kg (139 lb)              We Performed the Following     [facility] PROGRESS NOTES REPORT     MANUAL THER TECH,1+REGIONS,EA 15 MIN     SELF CARE MNGMENT TRAINING        Primary Care Provider Office Phone # Fax #    Klarissa Ann Aaseby-Aguilera, PA-C [phone] [address]  [facility] [address]        Equal Access to Services     JULIETA AGOSTO : Hadii aad ku hadasho Soomaali, waaxda luqadaha, qaybta kaalmada adeegyada, waxay idiin hayaan susana nam . So [facility] [phone].    ATENCIÓN: Si habla español, tiene a singh disposición servicios gratuitos de asistencia lingüística. Llame al [phone].    We comply with applicable federal civil rights laws and [place] laws. We do not discriminate on the basis of race, color, national origin, age, disability, sex, sexual orientation, or gender identity.            Thank you!     [facility] you for choosing [facility]  for your [facility]. Our goal is always to provide you with excellent care. Hearing back from our patients is one way we can continue to improve our services. Please take a few minutes to complete the written survey that you may receive in the mail after your visit with us. Thank you!             Your Updated Medication List - Protect others around you: Learn how to safely use, store and throw away your medicines at www.disposemymeds.org.          This list is  accurate as of 1/23/18 11:59 PM.  Always use your most recent med list.                   Brand Name Dispense Instructions for use Diagnosis    acidophilus Caps      2 tabs per day of an UNK dose        ALBUTEROL INHALER 17GM      PRN        ALLEGRA 180 MG tablet   Generic drug:  fexofenadine      Take by mouth daily Twice a day        B Complex Vitamins Soln           calcium carbonate 1250 MG tablet    OS- mg [place]. Ca     2 tab in the am        cholecalciferol 1000 UNIT tablet    vitamin D3     1 TABLET DAILY or PRN        coenzyme Q-10 100 MG Caps     30 Cap    2 tab at night        EPINEPHrine 0.3 MG/0.3ML injection 2-pack    EPIPEN 2-ANNEMARIE    0.6 mL    Inject 0.3 mLs (0.3 mg) into the muscle once as needed for anaphylaxis    Hx of bee sting allergy       flaxseed oil 1000 MG Caps      1 tsp daily        fluticasone 50 MCG/ACT spray    FLONASE    16 g    Spray 1-2 sprays into both nostrils daily    Nasal polyp       Magnesium 400 MG Caps      Take 2 tablets by mouth.        Multi-vitamin Tabs tablet   Generic drug:  multivitamin, therapeutic with minerals      ONE TABLET DAILY x 2x per day        omega 3 1000 MG Caps      3 CAPSULES DAILY WITH A MEAL        vitamin C 100 MG Chew      1 TABLET 3 TIMES DAILY

## 2018-01-23 NOTE — LETTER
"[facility]  [address]  [facility] [address]  [phone]    2018    Re: Janelle Alston   :   1975  MRN:  [id number]   REFERRING PHYSICIAN:   Nelson MARIO PT    Date of Initial Evaluation:  2018  Visits:  Rxs Used: 6  Reason for Referral:     Pelvic pain in female  Lumbago    PROGRESS  REPORT  Progress reporting period is from 18.       SUBJECTIVE  Subjective changes noted by patient:  Saw Dr. monahanterday for yearly physical. Reports she feels a \"bump/lump\" at cervix. However, MD didn't notice anything with Pap. Has just started noticing the past 1-2 weeks while using therawand. Will be seeing Dr. Lovelaec per MD recommendation. PElvic floor overall better than she was but still quite some pain after BM and thinks about PF every day.  Still feeling more tension. Limits life b/c can't wear tight pants, rectal pain, some urgency of urination. Using therawand 2x week. Getting Graston for abdominal 2 x week.   LBP is 99% better and is managing well. 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   Current pain level is 3/10  .     Previous pain level was   Initial Pain level: 4/10.   Changes in function:  Yes (See Goal flowsheet attached for changes in current functional level)  Adverse reaction to treatment or activity: activity - after BM's    OBJECTIVE  Changes noted in objective findings:  Yes,   Objective: ~65% improved pelvic floor tension since starting PT on 17. Has been see for 6 visits per MD order.  LBP WNL ROM and managing well with extension. L>R obturator internus/LA tightness of pelvic floor. Also some tenderness along puborectalis. Kegel strength 3, difficulty relaxing after contraction so still not allowing for kegels at home.   ASSESSMENT/PLAN  Updated problem list and treatment plan: Diagnosis 1:  Pelvic pain  Pain -  hot/cold therapy, manual therapy, self management, education and home " program  Decreased ROM/flexibility - manual therapy and therapeutic exercise  Decreased joint mobility - manual therapy and therapeutic exercise  Decreased strength - therapeutic exercise and therapeutic activities  Inflammation - cold therapy and self management/home program  Impaired muscle performance - neuro re-education  Decreased function - therapeutic activities  STG/LTGs have been met or progress has been made towards goals:  Yes (See Goal flow sheet completed today.)  Assessment of Progress: The patient's condition is improving.  Self Management Plans:  Patient has been instructed in a home treatment program.  Patient  has been instructed in self management of symptoms.  Janelle continues to require the following intervention to meet STG and LTG's:  MILADYS Alston   :   1975    Recommendations:  This patient would benefit from continued therapy.     Frequency:  2 X a month, once daily  Duration:  for 3 months        Thank you for your referral.    INQUIRIES  Therapist: Janay Kelly, MS, PT, OCS   [facility]  [address] [place] [address]  Phone: [phone]  Fax: [phone]

## 2018-01-24 NOTE — PROGRESS NOTES
"Subjective:  HPI                    Objective:  System    Physical Exam    General     ROS    Assessment/Plan:    PROGRESS  REPORT    Progress reporting period is from 01/23/18.       SUBJECTIVE  Subjective changes noted by patient:  Saw  yesterday for yearly physical. Reports she feels a \"bump/lump\" at cervix. However, MD didn't notice anything with Pap. Has just started noticing the past 1-2 weeks while using therawand. Will be seeing Dr. Lovelace per MD recommendation. PElvic floor overall better than she was but still quite some pain after BM and thinks about PF every day.  Still feeling more tension. Limits life b/c can't wear tight pants, rectal pain, some urgency of urination. Using therawand 2x week. Getting Graston for abdominal 2 x week.   LBP is 99% better and is managing well. 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   Current pain level is 3/10  .     Previous pain level was   Initial Pain level: 4/10.   Changes in function:  Yes (See Goal flowsheet attached for changes in current functional level)  Adverse reaction to treatment or activity: activity - after BM's    OBJECTIVE  Changes noted in objective findings:  Yes,   Objective: ~65% improved pelvic floor tension since starting PT on 12/12/17. Has been see for 6 visits per MD order.  LBP WNL ROM and managing well with extension. L>R obturator internus/LA tightness of pelvic floor. Also some tenderness along puborectalis. Kegel strength 3, difficulty relaxing after contraction so still not allowing for kegels at home.   ASSESSMENT/PLAN  Updated problem list and treatment plan: Diagnosis 1:  Pelvic pain  Pain -  hot/cold therapy, manual therapy, self management, education and home program  Decreased ROM/flexibility - manual therapy and therapeutic exercise  Decreased joint mobility - manual therapy and therapeutic exercise  Decreased strength - therapeutic exercise and therapeutic activities  Inflammation - cold therapy and " self management/home program  Impaired muscle performance - neuro re-education  Decreased function - therapeutic activities  STG/LTGs have been met or progress has been made towards goals:  Yes (See Goal flow sheet completed today.)  Assessment of Progress: The patient's condition is improving.  Self Management Plans:  Patient has been instructed in a home treatment program.  Patient  has been instructed in self management of symptoms.    Janelle continues to require the following intervention to meet STG and LTG's:  PT    Recommendations:  This patient would benefit from continued therapy.     Frequency:  2 X a month, once daily  Duration:  for 3 months        Please refer to the daily flowsheet for treatment today, total treatment time and time spent performing 1:1 timed codes.

## 2018-01-25 LAB
COPATH REPORT: NORMAL
PAP: NORMAL

## 2018-01-26 ENCOUNTER — OFFICE VISIT (OUTPATIENT)
Dept: OBGYN | Facility: CLINIC | Age: 43
End: 2018-01-26
Payer: COMMERCIAL

## 2018-01-26 VITALS — WEIGHT: 146 LBS | DIASTOLIC BLOOD PRESSURE: 72 MMHG | BODY MASS INDEX: 23.57 KG/M2 | SYSTOLIC BLOOD PRESSURE: 106 MMHG

## 2018-01-26 DIAGNOSIS — R10.2 CYCLICAL PELVIC PAIN: ICD-10-CM

## 2018-01-26 DIAGNOSIS — N88.8 NABOTHIAN CYST: Primary | ICD-10-CM

## 2018-01-26 LAB
FINAL DIAGNOSIS: NORMAL
HPV HR 12 DNA CVX QL NAA+PROBE: NEGATIVE
HPV16 DNA SPEC QL NAA+PROBE: NEGATIVE
HPV18 DNA SPEC QL NAA+PROBE: NEGATIVE
SPECIMEN DESCRIPTION: NORMAL
SPECIMEN SOURCE CVX/VAG CYTO: NORMAL

## 2018-01-26 PROCEDURE — 99213 OFFICE O/P EST LOW 20 MIN: CPT | Performed by: OBSTETRICS & GYNECOLOGY

## 2018-01-26 NOTE — PROGRESS NOTES
SUBJECTIVE:                                                   Janelle Alston is a 42 year old female who presents to clinic today for the following health issue(s):  Patient presents with:  Vaginal Problem: lump on left side near cervix      HPI:  Patient noted lump on the anterior left side of her cervix on personal vaginal exam.  Lump first noticed 2 weeks ago.  She saw her primary care physician who did a speculum exam and pelvic.  No lump was seen or felt at that time.  Pap smear resulted negative, HPV in process.    Patient states she has been diagnosed with pelvic floor dysfunction and was sent to pelvic floor physical therapist by her physician following her for back pain.  She feels as though the pelvic pain is improving.  She does not have regular periods as she has had a NovaSure endometrial ablation.  She does feel like the pain is cyclic.  She was diagnosed with endometriosis on laparoscopy at age 19.  Admits to bladder urgency.  She has an appointment with Dr. Tunde Lovelace for the symptoms as she has seen him in the past.    No LMP recorded. Patient has had an ablation.    Problem list and histories reviewed & adjusted, as indicated.  Additional history: as documented.    Patient Active Problem List   Diagnosis     Intermittent asthma     CARDIOVASCULAR SCREENING; LDL GOAL LESS THAN 160     LLQ abdominal pain     Hepatic adenoma     Allergic rhinitis     Menorrhagia with regular cycle     Pelvic pain in female     Lumbago     Past Surgical History:   Procedure Laterality Date     ABDOMINOPLASTY       C  DELIVERY ONLY       and      DILATION AND CURETTAGE, HYSTEROSCOPY, ABLATE ENDOMETRIUM NOVASURE, COMBINED N/A 2016    Procedure: COMBINED DILATION AND CURETTAGE, HYSTEROSCOPY, ABLATE ENDOMETRIUM NOVASURE;  Surgeon: Isak Eaton MD;  Location: [facility] OR     HC TOOTH EXTRACTION W/FORCEP       SURGICAL HISTORY OF -       laproscopy -abd-for endometriosis      Social History    Substance Use Topics     Smoking status: Never Smoker     Smokeless tobacco: Never Used     Alcohol use No      Problem (# of Occurrences) Relation (Name,Age of Onset)    CANCER (1) Maternal Grandmother: ? kidney cancer    DIABETES (1) Other: paternal greatgrandmother    Family History Negative (3) Mother, Father, Brother: 1              Current Outpatient Prescriptions on File Prior to Visit:  Flaxseed, Linseed, (FLAXSEED OIL) 1000 MG CAPS 1 tsp daily   EPINEPHrine (EPIPEN 2-ANNEMARIE) 0.3 MG/0.3ML injection Inject 0.3 mLs (0.3 mg) into the muscle once as needed for anaphylaxis   B Complex Vitamins SOLN    fluticasone (FLONASE) 50 MCG/ACT nasal spray Spray 1-2 sprays into both nostrils daily   fexofenadine (ALLEGRA) 180 MG tablet Take by mouth daily Twice a day   Magnesium 400 MG CAPS Take 2 tablets by mouth.   MULTI-VITAMIN PO TABS ONE TABLET DAILY x 2x per day   VITAMIN C 100 MG PO CHEW 1 TABLET 3 TIMES DAILY   VITAMIN D 1000 UNIT PO TABS 1 TABLET DAILY or PRN   CALCIUM 500 MG PO TABS 2 tab in the am   CO Q-10 100 MG PO CAPS 2 tab at night   ACIDOPHILUS OR CAPS 2 tabs per day of an UNK dose   OMEGA 3 1000 MG PO CAPS 3 CAPSULES DAILY WITH A MEAL   ALBUTEROL INHALER 17GM PRN     No current facility-administered medications on file prior to visit.   Allergies   Allergen Reactions     Amoxicillin Rash     Ciproflox [Ciprofloxacin] Nausea and Vomiting     Clindamycin Rash     Erythromycin      rash     Penicillins Rash     Sulfa Drugs Rash     Tetracycline Rash     Tylenol [Acetaminophen] Itching       ROS:  5 point ROS negative except as noted above in HPI, including Gen., Resp., CV, GI &  system review.    OBJECTIVE:     /72 (BP Location: Right arm, Patient Position: Chair, Cuff Size: Adult Regular)  Wt 146 lb (66.2 kg)  BMI 23.57 kg/m2   BMI: Body mass index is 23.57 kg/(m^2).  General: Alert and oriented, no distress.  Psychiatric: Mood and affect within normal limits.  Skin: Warm and dry, no lesions, rashes  or discolorations.  Neck: Neck supple. Thyroid palpbably normal in size and without nodularity.  Cardiovascular: Regular rate and rhythm, no murmurs, rubs or gallops.   Lungs:  Clear to auscultation bilaterally, breathing is unlabored.  Breasts:  Symmetric, no skin changes.  No dominant masses ilaterally.   Lymph:  No cervical, supraclavicular, infraclavicular, axillary or inguinal lymphadenopathy palpable.   Abdomen: Soft, nontender, no hepatosplenomegaly, no rebound or guarding, no masses, no hernias.   Vulva:  No external lesions, normal female hair distribution, no inguinal adenopathy.    Urethra:  Midline, non-tender, well supported, no discharge  Vagina:  Well-estrogenized, no abnormal discharge, no lesions  Cervix: no lesions, no discharge. No mass or lump seen on speculum exam however anterior to the left of the cervix, a small firm smooth mass could be palpated just prior to the junction with vaginal mucosa -- cyst consistent with Nabothian cyst  Uterus:  anteverted, smooth contour, without enlargement, mobile, and without tenderness   Pelvic floor: no point tenderness on exam of pelvic floor muscles  Ovaries:  No masses appreciated, non-tender, mobile  Rectal Exam: deferred  Musculoskeletal: extremities normal    In-Clinic Test Results:  No results found for this or any previous visit (from the past 24 hour(s)).    ASSESSMENT/PLAN:                                                        ICD-10-CM    1. Nabothian cyst N88.8    2. Cyclical pelvic pain R10.2      Cysts consistent with nabothian cysts of the cervix.  Pictures are drawn for patient.  Reassured that this is a benign finding.  Pap smear obtained last week resulted negative, HPV and process.    She is following with a physical therapist for pelvic dysfunction.  She does note that her pain is cyclic.  She already has an appointment with Dr. Tunde Lovelace (as she has seen him in the past) to further discuss these symptoms and make a plan for further  management.  She has a history of 2 previous C-sections.  She has not had a laparoscopy since the age of 19 at which time, per patient, she was diagnosed with endometriosis.  A repeat laparoscopy may be warranted.  Plan follow-up with Dr. Tunde Lovelace as scheduled.         Arabella Tee, [facility]

## 2018-01-26 NOTE — MR AVS SNAPSHOT
After Visit Summary   1/26/2018    Janelle Alston    MRN: [id number]           Patient Information     Date Of Birth          1975        Visit Information        Provider Department      1/26/2018 2:15 PM Arabella Tee DO [facility]        Today's Diagnoses     Nabothian cyst    -  1    Cyclical pelvic pain           Follow-ups after your visit        Follow-up notes from your care team     Return in about 4 weeks (around 2/23/2018) for scheduled with Dr. Lovelace.      Your next 10 appointments already scheduled     Jan 29, 2018  1:00 PM CST   US PELVIC COMPLETE W TRANSVAGINAL with [id number]   [facility] ([facility])    [address]  [facility] [address] [phone]           Please bring a list of your medicines (including vitamins, minerals and over-the-counter drugs). Also, tell your doctor about any allergies you may have. Wear comfortable clothes and leave your valuables at home.  Adults: Drink six 8-ounce glasses of fluid one hour before your exam. Do NOT empty your bladder.  If you need to empty your bladder before your exam, try to release only a little bit of urine. Then, drink another 8oz glass of fluid.  Children: Children who are potty trained should drink at least 4 cups (32 oz) of liquid 45 minutes to one hour prior to the exam. The child s bladder must be full in order to achieve a diagnostic exam. If your child is very uncomfortable or has an urgent need to pee, please notify a technologist; they will try to find out how much longer the wait may be and provide instructions to help relieve the pressure. Occasionally it is medically necessary to insert a urinary catheter to fill the bladder.  Please call the Imaging Department at your exam site with any questions.            Feb 06, 2018  2:30 PM CST   [facility] For Women Only with Janay Kelly, PT   [facility] ([facility]  )     [address]  [address]  [place] [address]   [phone]            Feb 21, 2018  1:35 PM CST   [facility] For Women Only with Janay Kelly, PT   [facility] ([facility] [place]  )    [address]  [address]  [place] [place] [address]   [phone]            Feb 22, 2018  3:15 PM CST   SHORT with Jay Lovelace MD   [facility]    [address] [address] [phone]              Who to contact     If you have questions or need follow up information about today's clinic visit or your schedule please contact [facility] directly at [phone].  Normal or non-critical lab and imaging results will be communicated to you by [employer], letter or phone within 4 business days after the clinic has received the results. If you do not hear from us within 7 days, please contact the clinic through Elevate[employer] or phone. If you have a critical or abnormal lab result, we will notify you by phone as soon as possible.  Submit refill requests through [employer] or call your pharmacy and they will forward the refill request to us. Please allow 3 business days for your refill to be completed.          Additional Information About Your Visit        [employer] Information     [employer] gives you secure access to your electronic health record. If you see a primary care provider, you can also send messages to your care team and make appointments. If you have questions, please call your primary care clinic.  If you do not have a primary care provider, please call [phone] and they will assist you.        Care EveryWhere ID     This is your Care EveryWhere ID. This could be used by other organizations to access your [place] medical records  [id number]        Your Vitals Were     BMI (Body Mass Index)                   23.57 kg/m2            Blood Pressure from Last 3 Encounters:   01/26/18 106/72   01/22/18 100/60    03/06/17 112/64    Weight from Last 3 Encounters:   01/26/18 146 lb (66.2 kg)   01/22/18 140 lb 13.9 oz (63.9 kg)   03/06/17 144 lb (65.3 kg)              Today, you had the following     No orders found for display       Primary Care Provider Office Phone # Fax #    Klarissa Ann Aaseby-Aguilera, PA-C [phone] [address]  [facility] [address]        Equal Access to Services     [facility] Hospital[facility] : Hadii aad ku hadasho Soomaali, waaxda luqadaha, qaybta kaalmada adeegyada, waxrandy hernandez haytoro nam . So [facility] [phone].    ATENCIÓN: Si habla español, tiene a singh disposición servicios gratuitos de asistencia lingüística. [facility] [phone].    We comply with applicable federal civil rights laws and [place] laws. We do not discriminate on the basis of race, color, national origin, age, disability, sex, sexual orientation, or gender identity.            Thank you!     Thank you for choosing [facility]  for your care. Our goal is always to provide you with excellent care. Hearing back from our patients is one way we can continue to improve our services. Please take a few minutes to complete the written survey that you may receive in the mail after your visit with us. Thank you!             Your Updated Medication List - Protect others around you: Learn how to safely use, store and throw away your medicines at www.disposemymeds.org.          This list is accurate as of 1/26/18  3:20 PM.  Always use your most recent med list.                   Brand Name Dispense Instructions for use Diagnosis    acidophilus Caps      2 tabs per day of an UNK dose        ALBUTEROL INHALER 17GM      PRN        ALLEGRA 180 MG tablet   Generic drug:  fexofenadine      Take by mouth daily Twice a day        B Complex Vitamins Soln           calcium carbonate 1250 MG tablet    OS- mg [place]. Ca     2 tab in the am        cholecalciferol 1000 UNIT tablet    vitamin D3     1 TABLET  DAILY or PRN        coenzyme Q-10 100 MG Caps     30 Cap    2 tab at night        EPINEPHrine 0.3 MG/0.3ML injection 2-pack    EPIPEN 2-ANNEMARIE    0.6 mL    Inject 0.3 mLs (0.3 mg) into the muscle once as needed for anaphylaxis    Hx of bee sting allergy       flaxseed oil 1000 MG Caps      1 tsp daily        fluticasone 50 MCG/ACT spray    FLONASE    16 g    Spray 1-2 sprays into both nostrils daily    Nasal polyp       Magnesium 400 MG Caps      Take 2 tablets by mouth.        Multi-vitamin Tabs tablet   Generic drug:  multivitamin, therapeutic with minerals      ONE TABLET DAILY x 2x per day        omega 3 1000 MG Caps      3 CAPSULES DAILY WITH A MEAL        vitamin C 100 MG Chew      1 TABLET 3 TIMES DAILY

## 2018-01-26 NOTE — NURSING NOTE
"Chief Complaint   Patient presents with     Vaginal Problem     lump on left side near cervix       Initial /72 (BP Location: Right arm, Patient Position: Chair, Cuff Size: Adult Regular)  Wt 146 lb (66.2 kg)  BMI 23.57 kg/m2 Estimated body mass index is 23.57 kg/(m^2) as calculated from the following:    Height as of 1/22/18: 5' 6\" (1.676 m).    Weight as of this encounter: 146 lb (66.2 kg).  Medication Reconciliation: complete     Emmy Sofia, ADAMA      "

## 2018-01-29 ENCOUNTER — HOSPITAL ENCOUNTER (OUTPATIENT)
Dept: ULTRASOUND IMAGING | Facility: CLINIC | Age: 43
Discharge: HOME | End: 2018-01-29
Attending: NURSE PRACTITIONER | Admitting: NURSE PRACTITIONER
Payer: COMMERCIAL

## 2018-01-29 DIAGNOSIS — R10.2 PELVIC PAIN IN FEMALE: ICD-10-CM

## 2018-01-29 PROCEDURE — 76856 US EXAM PELVIC COMPLETE: CPT

## 2018-01-30 NOTE — PROGRESS NOTES
Erlin Luis,  Your pelvic ultrasound was normal.Please let me know if you have any questions.  Sincerely,     Susan Haase, CNP

## 2018-02-06 ENCOUNTER — THERAPY VISIT (OUTPATIENT)
Dept: PHYSICAL THERAPY | Facility: CLINIC | Age: 43
End: 2018-02-06
Payer: COMMERCIAL

## 2018-02-06 DIAGNOSIS — R10.2 PELVIC PAIN IN FEMALE: ICD-10-CM

## 2018-02-06 DIAGNOSIS — M54.50 LUMBAGO: ICD-10-CM

## 2018-02-06 PROCEDURE — 97140 MANUAL THERAPY 1/> REGIONS: CPT | Mod: GP | Performed by: PHYSICAL THERAPIST

## 2018-02-06 PROCEDURE — 97535 SELF CARE MNGMENT TRAINING: CPT | Mod: GP | Performed by: PHYSICAL THERAPIST

## 2018-02-21 ENCOUNTER — THERAPY VISIT (OUTPATIENT)
Dept: PHYSICAL THERAPY | Facility: CLINIC | Age: 43
End: 2018-02-21
Payer: COMMERCIAL

## 2018-02-21 DIAGNOSIS — R10.2 PELVIC PAIN IN FEMALE: ICD-10-CM

## 2018-02-21 DIAGNOSIS — M54.50 LUMBAGO: ICD-10-CM

## 2018-02-21 PROCEDURE — 97140 MANUAL THERAPY 1/> REGIONS: CPT | Mod: GP | Performed by: PHYSICAL THERAPIST

## 2018-02-21 PROCEDURE — 97535 SELF CARE MNGMENT TRAINING: CPT | Mod: GP | Performed by: PHYSICAL THERAPIST

## 2018-03-05 ENCOUNTER — THERAPY VISIT (OUTPATIENT)
Dept: PHYSICAL THERAPY | Facility: CLINIC | Age: 43
End: 2018-03-05
Payer: COMMERCIAL

## 2018-03-05 DIAGNOSIS — R10.2 PELVIC PAIN IN FEMALE: ICD-10-CM

## 2018-03-05 DIAGNOSIS — M54.50 LUMBAGO: ICD-10-CM

## 2018-03-05 PROCEDURE — 97140 MANUAL THERAPY 1/> REGIONS: CPT | Mod: GP | Performed by: PHYSICAL THERAPIST

## 2018-03-05 PROCEDURE — 97535 SELF CARE MNGMENT TRAINING: CPT | Mod: GP | Performed by: PHYSICAL THERAPIST

## 2018-03-21 ENCOUNTER — THERAPY VISIT (OUTPATIENT)
Dept: PHYSICAL THERAPY | Facility: CLINIC | Age: 43
End: 2018-03-21
Payer: COMMERCIAL

## 2018-03-21 DIAGNOSIS — R10.2 PELVIC PAIN IN FEMALE: ICD-10-CM

## 2018-03-21 DIAGNOSIS — M54.50 LUMBAGO: ICD-10-CM

## 2018-03-21 PROCEDURE — 97140 MANUAL THERAPY 1/> REGIONS: CPT | Mod: GP | Performed by: PHYSICAL THERAPIST

## 2018-03-21 PROCEDURE — 97110 THERAPEUTIC EXERCISES: CPT | Mod: GP | Performed by: PHYSICAL THERAPIST

## 2018-03-21 PROCEDURE — 97535 SELF CARE MNGMENT TRAINING: CPT | Mod: GP | Performed by: PHYSICAL THERAPIST

## 2018-03-21 NOTE — MR AVS SNAPSHOT
After Visit Summary   3/21/2018    Janelle Alston    MRN: [id number]           Patient Information     Date Of Birth          1975        Visit Information        Provider Department      3/21/2018 1:35 PM Janay Kelly, PT [facility]        Today's Diagnoses     Pelvic pain in female        Lumbago           Follow-ups after your visit        Your next 10 appointments already scheduled     Apr 09, 2018 11:45 AM CDT   [facility] with Janay Divine Guerrero, PT   [facility] ([facility] [place]  )    [address] [address] [address]   [phone]            Apr 19, 2018  1:10 PM CDT   [facility] with Janayher Haskins Cesar, PT   [facility] ([facility] [place]  )    [address] [address] [address]   [phone]            May 02, 2018  1:35 PM CDT   [facility] with Janay Haskins Cesar, PT   [facility] ([facility] [place]  )    [address] [address] [address]   [phone]              Who to contact     If you have questions or need follow up information about today's clinic visit or your schedule please contact [facility] directly at [phone].  Normal or non-critical lab and imaging results will be communicated to you by [employer], letter or phone within 4 business days after the clinic has received the results. If you do not hear from us within 7 days, please contact the clinic through [employer] or phone. If you have a critical or abnormal lab result, we will notify you by phone as soon as possible.  Submit refill requests through [employer] or call your pharmacy and they will forward the refill request to us. Please allow 3 business days for your refill to be completed.          Additional Information About Your Visit        Divshot Information     [employer] gives you secure access to your electronic health record. If you see a primary care provider, you can also send  messages to your care team and make appointments. If you have questions, please call your primary care clinic.  If you do not have a primary care provider, please call [phone] and they will assist you.        Care EveryWhere ID     This is your Care EveryWhere ID. This could be used by other organizations to access your [place] medical records  [id number]         Blood Pressure from Last 3 Encounters:   01/26/18 106/72   01/22/18 100/60   03/06/17 112/64    Weight from Last 3 Encounters:   01/26/18 66.2 kg (146 lb)   01/22/18 63.9 kg (140 lb 13.9 oz)   03/06/17 65.3 kg (144 lb)              We Performed the Following     [facility] PROGRESS NOTES REPORT     MANUAL THER TECH,1+REGIONS,EA 15 MIN     SELF CARE MNGMENT TRAINING     THERAPEUTIC EXERCISES        Primary Care Provider Office Phone # Fax #    Klarissa Ann Aaseby-Aguilera, PA-C [phone]       [address] [address] [facility] [address]        Equal Access to Services     [facility]: Hadii aad ku hadasho Soomaali, waaxda luqadaha, qaybta kaalmada adeegyada, waxay idiin haytoro nam . So [facility] [place] [facility] [phone].    ATENCIÓN: Si habla español, tiene a singh disposición servicios gratuitos de asistencia lingüística. Llame al [phone].    We comply with applicable federal civil rights laws and [place] laws. We do not discriminate on the basis of race, color, national origin, age, disability, sex, sexual orientation, or gender identity.            Thank you!     Thank you for choosing [facility]   for your care. Our goal is always to provide you with excellent care. Hearing back from our patients is one way we can continue to improve our services. Please take a few minutes to complete the written survey that you may receive in the mail after your visit with us. Thank you!             Your Updated Medication List - Protect others around you: Learn how to safely use, store and throw away your medicines at www.disposemymeds.org.           This list is accurate as of 3/21/18  2:35 PM.  Always use your most recent med list.                   Brand Name Dispense Instructions for use Diagnosis    acidophilus Caps      2 tabs per day of an UNK dose        ALBUTEROL INHALER 17GM      PRN        ALLEGRA 180 MG tablet   Generic drug:  fexofenadine      Take by mouth daily Twice a day        B Complex Vitamins Soln           calcium carbonate 1250 MG tablet    OS- mg [place]. Ca     2 tab in the am        cholecalciferol 1000 UNIT tablet    vitamin D3     1 TABLET DAILY or PRN        coenzyme Q-10 100 MG Caps     30 Cap    2 tab at night        EPINEPHrine 0.3 MG/0.3ML injection 2-pack    EPIPEN 2-ANNEMARIE    0.6 mL    Inject 0.3 mLs (0.3 mg) into the muscle once as needed for anaphylaxis    Hx of bee sting allergy       flaxseed oil 1000 MG Caps      1 tsp daily        fluticasone 50 MCG/ACT spray    FLONASE    16 g    Spray 1-2 sprays into both nostrils daily    Nasal polyp       Magnesium 400 MG Caps      Take 2 tablets by mouth.        Multi-vitamin Tabs tablet   Generic drug:  multivitamin, therapeutic with minerals      ONE TABLET DAILY x 2x per day        omega 3 1000 MG Caps      3 CAPSULES DAILY WITH A MEAL        vitamin C 100 MG Chew      1 TABLET 3 TIMES DAILY

## 2018-03-21 NOTE — PROGRESS NOTES
Subjective:  HPI                    Objective:  System    Physical Exam    General     ROS    Assessment/Plan:    PROGRESS  REPORT    Progress reporting period is from 03/21/18.       SUBJECTIVE  Subjective changes noted by patient:  .  Subjective: Still having to manage pain about 2 hours in order to get 8 hours of relief. Rectal pain primarily post BM. Still also having  urinary urgency.. Using  therawand pretty regularly.   [place] painfree.   Current pain level is 3/10  .     Previous pain level was   Initial Pain level: 4/10.   Changes in function:  Yes (See Goal flowsheet attached for changes in current functional level)  Adverse reaction to treatment or activity: activity - post-BM    OBJECTIVE  Changes noted in objective findings:  Yes,   Objective: Internal coccyx mobilization/Mariam's massage initiatied today as sxs plateaued. Coccyx deviated R and stuck in flexion.  Very tender to R coccyx/puborectalis.     ASSESSMENT/PLAN  Updated problem list and treatment plan: Diagnosis 1:  Pelvic/rectal pain  Pain -  hot/cold therapy, US, manual therapy, self management, education and home program  Decreased joint mobility - manual therapy and therapeutic exercise  Inflammation - cold therapy, US and self management/home program  Impaired muscle performance - neuro re-education  Decreased function - therapeutic activities  STG/LTGs have been met or progress has been made towards goals:  Yes (See Goal flow sheet completed today.)  Assessment of Progress: The patient's progress has plateaued.  Self Management Plans:  Patient has been instructed in a home treatment program.  Patient  has been instructed in self management of symptoms.    Janelle continues to require the following intervention to meet STG and LTG's:  PT    Recommendations:  This patient would benefit from continued therapy.     Frequency:  2 X a month, once daily  Duration:  for 2 months        Please refer to the daily flowsheet for treatment today,  total treatment time and time spent performing 1:1 timed codes.

## 2018-03-21 NOTE — LETTER
[facility]  [address]  [address]  [facility] [address]  [phone]    2018    Re: Janelle Alston   :   1975  MRN:  [id number]   REFERRING PHYSICIAN:   Nelson [facility] PT    Date of Initial Evaluation:  2017  Visits:  Rxs Used: 10  Reason for Referral:     Pelvic pain in female  Lumbago    PROGRESS  REPORT    Progress reporting period is from 18.       SUBJECTIVE  Subjective changes noted by patient:  .  Subjective: Still having to manage pain about 2 hours in order to get 8 hours of relief. Rectal pain primarily post BM. Still also having  urinary urgency.. Using  therawand pretty regularly.   [place] painfree.   Current pain level is 3/10  .     Previous pain level was   Initial Pain level: 4/10.   Changes in function:  Yes (See Goal flowsheet attached for changes in current functional level)  Adverse reaction to treatment or activity: activity - post-BM  OBJECTIVE  Changes noted in objective findings:  Yes,   Objective: Internal coccyx mobilization/Mariam's massage initiatied today as sxs plateaued. Coccyx deviated R and stuck in flexion.  Very tender to R coccyx/puborectalis.   ASSESSMENT/PLAN  Updated problem list and treatment plan: Diagnosis 1:  Pelvic/rectal pain  Pain -  hot/cold therapy, US, manual therapy, self management, education and home program  Decreased joint mobility - manual therapy and therapeutic exercise  Inflammation - cold therapy, US and self management/home program  Impaired muscle performance - neuro re-education  Decreased function - therapeutic activities  STG/LTGs have been met or progress has been made towards goals:  Yes (See Goal flow sheet completed today.)  Assessment of Progress: The patient's progress has plateaued.  Self Management Plans:  Patient has been instructed in a home treatment program.  Patient  has been instructed in self management of symptoms.  Janelle continues to require the following  intervention to meet STG and LTG's:  PT    Recommendations:  This patient would benefit from continued therapy.     Frequency:  2 X a month, once daily  Duration:  for 2 months      Thank you for your referral.    INQUIRIES  Therapist: Janay Kelly, MS, PT, OCS  [facility]  [id number] [place] [address]  Phone: [phone] / Fax: [phone]

## 2018-04-09 ENCOUNTER — THERAPY VISIT (OUTPATIENT)
Dept: PHYSICAL THERAPY | Facility: CLINIC | Age: 43
End: 2018-04-09
Payer: COMMERCIAL

## 2018-04-09 DIAGNOSIS — R10.2 PELVIC PAIN IN FEMALE: ICD-10-CM

## 2018-04-09 DIAGNOSIS — M54.50 LUMBAGO: ICD-10-CM

## 2018-04-09 PROCEDURE — 97140 MANUAL THERAPY 1/> REGIONS: CPT | Mod: GP | Performed by: PHYSICAL THERAPIST

## 2018-04-19 ENCOUNTER — THERAPY VISIT (OUTPATIENT)
Dept: PHYSICAL THERAPY | Facility: CLINIC | Age: 43
End: 2018-04-19
Payer: COMMERCIAL

## 2018-04-19 DIAGNOSIS — M54.50 LUMBAGO: ICD-10-CM

## 2018-04-19 DIAGNOSIS — R10.2 PELVIC PAIN IN FEMALE: ICD-10-CM

## 2018-04-19 PROCEDURE — 97140 MANUAL THERAPY 1/> REGIONS: CPT | Mod: GP | Performed by: PHYSICAL THERAPIST

## 2018-05-02 ENCOUNTER — THERAPY VISIT (OUTPATIENT)
Dept: PHYSICAL THERAPY | Facility: CLINIC | Age: 43
End: 2018-05-02
Payer: COMMERCIAL

## 2018-05-02 DIAGNOSIS — M54.50 LUMBAGO: ICD-10-CM

## 2018-05-02 DIAGNOSIS — R10.2 PELVIC PAIN IN FEMALE: ICD-10-CM

## 2018-05-02 PROCEDURE — 97140 MANUAL THERAPY 1/> REGIONS: CPT | Mod: GP | Performed by: PHYSICAL THERAPIST

## 2018-06-05 ENCOUNTER — THERAPY VISIT (OUTPATIENT)
Dept: PHYSICAL THERAPY | Facility: CLINIC | Age: 43
End: 2018-06-05
Payer: COMMERCIAL

## 2018-06-05 DIAGNOSIS — M54.50 LUMBAGO: ICD-10-CM

## 2018-06-05 DIAGNOSIS — R10.2 PELVIC PAIN IN FEMALE: ICD-10-CM

## 2018-06-05 PROCEDURE — 97140 MANUAL THERAPY 1/> REGIONS: CPT | Mod: GP | Performed by: PHYSICAL THERAPIST

## 2018-06-05 NOTE — MR AVS SNAPSHOT
After Visit Summary   6/5/2018    Janelle Alston    MRN: [id number]           Patient Information     Date Of Birth          1975        Visit Information        Provider Department      6/5/2018 1:10 PM Janay Kelly, PT [facility]        Today's Diagnoses     Pelvic pain in female        Lumbago           Follow-ups after your visit        Who to contact     If you have questions or need follow up information about today's clinic visit or your schedule please contact [facility] directly at [phone].  Normal or non-critical lab and imaging results will be communicated to you by [employer], letter or phone within 4 business days after the clinic has received the results. If you do not hear from us within 7 days, please contact the clinic through [employer] or phone. If you have a critical or abnormal lab result, we will notify you by phone as soon as possible.  Submit refill requests through [employer] or call your pharmacy and they will forward the refill request to us. Please allow 3 business days for your refill to be completed.          Additional Information About Your Visit        MyChart Information     [employer] gives you secure access to your electronic health record. If you see a primary care provider, you can also send messages to your care team and make appointments. If you have questions, please call your primary care clinic.  If you do not have a primary care provider, please call [phone] and they will assist you.        Care EveryWhere ID     This is your Care EveryWhere ID. This could be used by other organizations to access your [place] medical records  [id number]         Blood Pressure from Last 3 Encounters:   01/26/18 106/72   01/22/18 100/60   03/06/17 112/64    Weight from Last 3 Encounters:   01/26/18 66.2 kg (146 lb)   01/22/18 63.9 kg (140 lb 13.9 oz)   03/06/17 65.3 kg (144 lb)              We Performed the Following     [facility] PROGRESS NOTES  REPORT     MANUAL THER TECH,1+REGIONS,EA 15 MIN        Primary Care Provider Office Phone # Fax #    Ramona Ann Aaseby-Aguilera, PA-C [phone] [address]  [facility] [address]        Equal Access to Services     [facility] : Hadii aad ku hadasho Soomaali, waaxda luqadaha, qaybta kaalmada adeegyada, waxay idiin hayaan adeagusto medina lahellen zapien. So [facility] [phone].    ATENCIÓN: Si habla español, tiene a singh disposición servicios gratuitos de asistencia lingüística. Jess al [phone].    We comply with applicable federal civil rights laws and [place] laws. We do not discriminate on the basis of race, color, national origin, age, disability, sex, sexual orientation, or gender identity.            Thank you!     Thank you for choosing [facility]  for your care. Our goal is always to provide you with excellent care. Hearing back from our patients is one way we can continue to improve our services. Please take a few minutes to complete the written survey that you may receive in the mail after your visit with us. Thank you!             Your Updated Medication List - Protect others around you: Learn how to safely use, store and throw away your medicines at www.disposemymeds.org.          This list is accurate as of 6/5/18  2:05 PM.  Always use your most recent med list.                   Brand Name Dispense Instructions for use Diagnosis    acidophilus Caps      2 tabs per day of an UNK dose        ALBUTEROL INHALER 17GM      PRN        ALLEGRA 180 MG tablet   Generic drug:  fexofenadine      Take by mouth daily Twice a day        B Complex Vitamins Soln           calcium carbonate 500 MG tablet    OS- mg [place]. Ca     2 tab in the am        cholecalciferol 1000 UNIT tablet    vitamin D3     1 TABLET DAILY or PRN        coenzyme Q-10 100 MG Caps     30 Cap    2 tab at night        EPINEPHrine 0.3 MG/0.3ML injection 2-pack    EPIPEN 2-ANNEMARIE    0.6 mL    Inject 0.3 mLs (0.3 mg)  into the muscle once as needed for anaphylaxis    Hx of bee sting allergy       flaxseed oil 1000 MG Caps      1 tsp daily        fluticasone 50 MCG/ACT spray    FLONASE    16 g    Spray 1-2 sprays into both nostrils daily    Nasal polyp       Magnesium 400 MG Caps      Take 2 tablets by mouth.        Multi-vitamin Tabs tablet   Generic drug:  multivitamin, therapeutic with minerals      ONE TABLET DAILY x 2x per day        omega 3 1000 MG Caps      3 CAPSULES DAILY WITH A MEAL        vitamin C 100 MG Chew      1 TABLET 3 TIMES DAILY

## 2018-06-05 NOTE — LETTER
[facility]  [address], [address]  [facility] [address]  [phone]    2018    Re: Janelle M Gamaliel   :   1975  MRN:  [id number]   REFERRING PHYSICIAN:   Nelson [facility]    Date of Initial Evaluation:  17  Visits:  Rxs Used: 14  Reason for Referral:     Pelvic pain in female  Lumbago    Assessment/Plan:    DISCHARGE REPORT    Progress reporting period is from 18.       SUBJECTIVE  Subjective changes noted by patient:  .  Subjective: 10 minutes late. Doing well. Pelvic pain is much better. Not having pain with BM anymore. Most urination urgency after voiding and kind of lingers. Able to ignore it just feels like has to go. Not sure if emptying all the way or not. LBP doing well.     Current pain level is 0/10  .     Previous pain level was   Initial Pain level: 4/10.   Changes in function:  Yes (See Goal flowsheet attached for changes in current functional level)  Adverse reaction to treatment or activity: None    OBJECTIVE  Changes noted in objective findings:  Yes,   Objective: ~90-95% improvement in PF tension overall. Slight TTP to L OI but no tenderness elsewhere. Good coccyx position. Kegel strength 4-/5.      ASSESSMENT/PLAN  Updated problem list and treatment plan:   STG/LTGs have been met or progress has been made towards goals:  Yes (See Goal flow sheet completed today.)  Assessment of Progress: The patient's condition is improving.  The patient has met all of their long term goals.  Self Management Plans:  Patient is independent in a home treatment program.  Patient is independent in self management of symptoms.    Janelle continues to require the following intervention to meet STG and LTG's:  PT intervention is no longer required to meet STG/LTG.                Re: Janelle Alston   :   1975    Recommendations:  This patient is ready to be discharged from therapy and continue their home treatment program.      Thank you for your  referral.    INQUIRIES  Therapist: Janay Kelly, MS, PT, OCS  [facility]  [id number] [place] [address], [place] [id number]  Phone: [phone]  Fax: [phone]

## 2018-06-05 NOTE — PROGRESS NOTES
Subjective:  HPI                    Objective:  System    Physical Exam    General     ROS    Assessment/Plan:    DISCHARGE REPORT    Progress reporting period is from 06/05/18.       SUBJECTIVE  Subjective changes noted by patient:  .  Subjective: 10 minutes late. Doing well. Pelvic pain is much better. Not having pain with BM anymore. Most urination urgency after voiding and kind of lingers. Able to ignore it just feels like has to go. Not sure if emptying all the way or not. LBP doing well.     Current pain level is 0/10  .     Previous pain level was   Initial Pain level: 4/10.   Changes in function:  Yes (See Goal flowsheet attached for changes in current functional level)  Adverse reaction to treatment or activity: None    OBJECTIVE  Changes noted in objective findings:  Yes,   Objective: ~90-95% improvement in PF tension overall. Slight TTP to L OI but no tenderness elsewhere. Good coccyx position. Kegel strength 4-/5.      ASSESSMENT/PLAN  Updated problem list and treatment plan:   STG/LTGs have been met or progress has been made towards goals:  Yes (See Goal flow sheet completed today.)  Assessment of Progress: The patient's condition is improving.  The patient has met all of their long term goals.  Self Management Plans:  Patient is independent in a home treatment program.  Patient is independent in self management of symptoms.    Janelle continues to require the following intervention to meet STG and LTG's:  PT intervention is no longer required to meet STG/LTG.    Recommendations:  This patient is ready to be discharged from therapy and continue their home treatment program.    Please refer to the daily flowsheet for treatment today, total treatment time and time spent performing 1:1 timed codes.

## 2018-09-10 ENCOUNTER — THERAPY VISIT (OUTPATIENT)
Dept: PHYSICAL THERAPY | Facility: CLINIC | Age: 43
End: 2018-09-10
Payer: COMMERCIAL

## 2018-09-10 DIAGNOSIS — R10.2 PELVIC PAIN IN FEMALE: Primary | ICD-10-CM

## 2018-09-10 PROCEDURE — 97140 MANUAL THERAPY 1/> REGIONS: CPT | Mod: GP | Performed by: PHYSICAL THERAPIST

## 2018-09-10 PROCEDURE — 97110 THERAPEUTIC EXERCISES: CPT | Mod: GP | Performed by: PHYSICAL THERAPIST

## 2018-09-10 NOTE — MR AVS SNAPSHOT
After Visit Summary   9/10/2018    Janelle Alston    MRN: [id number]           Patient Information     Date Of Birth          1975        Visit Information        Provider Department      9/10/2018 3:35 PM Janay Kelly, PT [facility]        Today's Diagnoses     Pelvic pain in female    -  1       Follow-ups after your visit        Who to contact     If you have questions or need follow up information about today's clinic visit or your schedule please contact [facility] directly at [phone].  Normal or non-critical lab and imaging results will be communicated to you by [employer], letter or phone within 4 business days after the clinic has received the results. If you do not hear from us within 7 days, please contact the clinic through [employer] or phone. If you have a critical or abnormal lab result, we will notify you by phone as soon as possible.  Submit refill requests through [employer] or call your pharmacy and they will forward the refill request to us. Please allow 3 business days for your refill to be completed.          Additional Information About Your Visit        MyChart Information     [employer] gives you secure access to your electronic health record. If you see a primary care provider, you can also send messages to your care team and make appointments. If you have questions, please call your primary care clinic.  If you do not have a primary care provider, please call [phone] and they will assist you.        Care EveryWhere ID     This is your Care EveryWhere ID. This could be used by other organizations to access your [place] medical records  [id number]         Blood Pressure from Last 3 Encounters:   01/26/18 106/72   01/22/18 100/60   03/06/17 112/64    Weight from Last 3 Encounters:   01/26/18 66.2 kg (146 lb)   01/22/18 63.9 kg (140 lb 13.9 oz)   03/06/17 65.3 kg (144 lb)              We Performed the Following     [facility] PROGRESS NOTES REPORT      MANUAL THER TECH,1+REGIONS,EA 15 MIN     THERAPEUTIC EXERCISES        Primary Care Provider Office Phone # Fax #    Ramona Ann Aaseby-Aguilera, PA-C [phone] [address]  [facility] [address]        Equal Access to Services     [facility] : Hadii aad ku hadasho Soomaali, waaxda luqadaha, qaybta kaalmada adeegyada, waxay idiin hayaan adeagusto carol lahellen zapien. So [facility] [phone].    ATENCIÓN: Si habla español, tiene a singh disposición servicios gratuitos de asistencia lingüística. Jess al [phone].    We comply with applicable federal civil rights laws and [place] laws. We do not discriminate on the basis of race, color, national origin, age, disability, sex, sexual orientation, or gender identity.            Thank you!     Thank you for choosing [facility]  for your care. Our goal is always to provide you with excellent care. Hearing back from our patients is one way we can continue to improve our services. Please take a few minutes to complete the written survey that you may receive in the mail after your visit with us. Thank you!             Your Updated Medication List - Protect others around you: Learn how to safely use, store and throw away your medicines at www.disposemymeds.org.          This list is accurate as of 9/10/18 11:59 PM.  Always use your most recent med list.                   Brand Name Dispense Instructions for use Diagnosis    acidophilus Caps      2 tabs per day of an UNK dose        ALBUTEROL INHALER 17GM      PRN        ALLEGRA 180 MG tablet   Generic drug:  fexofenadine      Take by mouth daily Twice a day        B Complex Vitamins Soln           calcium carbonate 500 mg {elemental} 500 MG tablet    OS-LG     2 tab in the am        cholecalciferol 1000 UNIT tablet    vitamin D3     1 TABLET DAILY or PRN        coenzyme Q-10 100 MG Caps     30 Cap    2 tab at night        EPINEPHrine 0.3 MG/0.3ML injection 2-pack    EPIPEN 2-ANNEMARIE    0.6 mL    Inject  0.3 mLs (0.3 mg) into the muscle once as needed for anaphylaxis    Hx of bee sting allergy       flaxseed oil 1000 MG Caps      1 tsp daily        fluticasone 50 MCG/ACT spray    FLONASE    16 g    Spray 1-2 sprays into both nostrils daily    Nasal polyp       Magnesium 400 MG Caps      Take 2 tablets by mouth.        Multi-vitamin Tabs tablet   Generic drug:  multivitamin, therapeutic with minerals      ONE TABLET DAILY x 2x per day        omega 3 1000 MG Caps      3 CAPSULES DAILY WITH A MEAL        vitamin C 100 MG Chew      1 TABLET 3 TIMES DAILY

## 2018-09-10 NOTE — LETTER
MOOK MARIO PT  [address] [address], [address]  [facility] [address]  [phone]    2018    Re: Janelle FLORES Gamaliel   :   1975  MRN:  [id number]   REFERRING PHYSICIAN:   Nelson MARIO PT    Date of Initial Evaluation:  17  Visits:  Rxs Used: 15  Reason for Referral:  Pelvic pain in female      PROGRESS  REPORT    Progress reporting period is from 09/10/18.       SUBJECTIVE  Subjective changes noted by patient:  .  Subjective: A couple weeks ago started to have flare-up of pain in rectal area so scheduled PT appt (hadn't been in since 18). Since then, pain is pretty much resolved but wanted to come in just to make sure PF muscles were not spasming. No pain afterwards with BM's currently and no pain with intercourse. Working on HEP/stretching regularly and teaching fitness classes.     Current pain level is  Current Pain level: 1/10.     Previous pain level was   Initial Pain level: 4/10.   Changes in function:  Yes (See Goal flowsheet attached for changes in current functional level)  Adverse reaction to treatment or activity: None    OBJECTIVE  Changes noted in objective findings:  Yes,   Objective: Coccyx postion WNL and not tender. Slight puborectalis tenderness but no sharp pain. Kegel strength 4/5.      ASSESSMENT/PLAN  Updated problem list and treatment plan: Diagnosis 1:  Coccyx pain  Pain -  hot/cold therapy, manual therapy, self management, education and home program  Decreased ROM/flexibility - manual therapy and therapeutic exercise  Decreased joint mobility - manual therapy and therapeutic exercise  Decreased strength - therapeutic exercise and therapeutic activities  Decreased proprioception - neuro re-education and therapeutic activities  Inflammation - cold therapy and self management/home program  Impaired muscle performance - neuro re-education  Decreased function - therapeutic activities  Impaired posture - neuro re-education  STG/LTGs have  been met or progress has been made towards goals:  Yes (See Goal flow sheet completed today.)    Re: Janelle FLORES Gamaliel   :   1975    Assessment of Progress: The patient's condition is improving.  Self Management Plans:  Patient has been instructed in a home treatment program.  Patient  has been instructed in self management of symptoms.    Janelle continues to require the following intervention to meet STG and LTG's:  PT    Recommendations:  This patient would benefit from continued therapy.     Frequency:  1 X a month, once daily  Duration:  for 3 months        Thank you for your referral.    INQUIRIES  Therapist:    MOOK MARIO PT  [address] [address] [address]  Phone: [phone]  Fax: [phone]

## 2018-09-11 NOTE — PROGRESS NOTES
Subjective:  HPI                    Objective:  System    Physical Exam    General     ROS    Assessment/Plan:    PROGRESS  REPORT    Progress reporting period is from 09/10/18.       SUBJECTIVE  Subjective changes noted by patient:  .  Subjective: A couple weeks ago started to have flare-up of pain in rectal area so scheduled PT appt (hadn't been in since 06/05/18). Since then, pain is pretty much resolved but wanted to come in just to make sure PF muscles were not spasming. No pain afterwards with BM's currently and no pain with intercourse. Working on HEP/stretching regularly and teaching fitness classes.     Current pain level is  Current Pain level: 1/10.     Previous pain level was   Initial Pain level: 4/10.   Changes in function:  Yes (See Goal flowsheet attached for changes in current functional level)  Adverse reaction to treatment or activity: None    OBJECTIVE  Changes noted in objective findings:  Yes,   Objective: Coccyx postion WNL and not tender. Slight puborectalis tenderness but no sharp pain. Kegel strength 4/5.      ASSESSMENT/PLAN  Updated problem list and treatment plan: Diagnosis 1:  Coccyx pain  Pain -  hot/cold therapy, manual therapy, self management, education and home program  Decreased ROM/flexibility - manual therapy and therapeutic exercise  Decreased joint mobility - manual therapy and therapeutic exercise  Decreased strength - therapeutic exercise and therapeutic activities  Decreased proprioception - neuro re-education and therapeutic activities  Inflammation - cold therapy and self management/home program  Impaired muscle performance - neuro re-education  Decreased function - therapeutic activities  Impaired posture - neuro re-education  STG/LTGs have been met or progress has been made towards goals:  Yes (See Goal flow sheet completed today.)  Assessment of Progress: The patient's condition is improving.  Self Management Plans:  Patient has been instructed in a home treatment  program.  Patient  has been instructed in self management of symptoms.    Janelle continues to require the following intervention to meet STG and LTG's:  PT    Recommendations:  This patient would benefit from continued therapy.     Frequency:  1 X a month, once daily  Duration:  for 3 months        Please refer to the daily flowsheet for treatment today, total treatment time and time spent performing 1:1 timed codes.

## 2018-11-28 ENCOUNTER — OFFICE VISIT (OUTPATIENT)
Dept: FAMILY MEDICINE | Facility: CLINIC | Age: 43
End: 2018-11-28
Payer: COMMERCIAL

## 2018-11-28 VITALS
HEIGHT: 66 IN | TEMPERATURE: 98.9 F | RESPIRATION RATE: 16 BRPM | WEIGHT: 137.1 LBS | DIASTOLIC BLOOD PRESSURE: 60 MMHG | BODY MASS INDEX: 22.03 KG/M2 | SYSTOLIC BLOOD PRESSURE: 100 MMHG | HEART RATE: 58 BPM | OXYGEN SATURATION: 98 %

## 2018-11-28 DIAGNOSIS — J45.20 MILD INTERMITTENT ASTHMA WITHOUT COMPLICATION: Primary | ICD-10-CM

## 2018-11-28 DIAGNOSIS — J33.9 NASAL POLYP: ICD-10-CM

## 2018-11-28 PROCEDURE — 99214 OFFICE O/P EST MOD 30 MIN: CPT | Performed by: FAMILY MEDICINE

## 2018-11-28 RX ORDER — ALBUTEROL SULFATE 90 UG/1
1-2 AEROSOL, METERED RESPIRATORY (INHALATION) EVERY 4 HOURS PRN
Qty: 2 INHALER | Refills: 1 | Status: SHIPPED | OUTPATIENT
Start: 2018-11-28 | End: 2022-07-05

## 2018-11-28 RX ORDER — FEXOFENADINE HCL 180 MG/1
180 TABLET ORAL DAILY
COMMUNITY
Start: 2018-11-28

## 2018-11-28 NOTE — PROGRESS NOTES
SUBJECTIVE:   Janelle Alston is a 43 year old female presenting with a chief complaint of   Chief Complaint   Patient presents with     Asthma     She is an established patient of [facility].    Asthma Follow-Up    Was ACT completed today?    Yes    ACT Total Scores 11/28/2018   ACT TOTAL SCORE -   ASTHMA ER VISITS -   ASTHMA HOSPITALIZATIONS -   ACT TOTAL SCORE (Goal Greater than or Equal to 20) 21   In the past 12 months, how many times did you visit the emergency room for your asthma without being admitted to the hospital? 0   In the past 12 months, how many times were you hospitalized overnight because of your asthma? 0       Recent asthma triggers that patient is dealing with: exercise or sports and illness    Amount of exercise or physical activity: 6-7 days/week for an average of 45-60 minutes    Patient teaches at [employer], using Albuterol 2 puffs prior to exercise 3 times per week.    Outside of exercise, patient rarely uses Albuterol for wheezing and shortness of breath more than twice per week    Problems taking medications regularly: No    Medication side effects: none    Diet: gluten-free/reduced    Patient incidentally mentions a possible polyp involving her right naris, which was previously evaluated by ENT.  Patient states that she was previously diagnosed with a deviated septum.  She continues on Flonase and Allegra, as before.  Right naris whistles at night intermittently.  Patient has not tried interventions, but she requests assessment today.  Chronic nasal congestion and postnasal drainage continue.  No cough reported.      Review of Systems   No chest pain.    Patient Active Problem List   Diagnosis     Intermittent asthma     CARDIOVASCULAR SCREENING; LDL GOAL LESS THAN 160     LLQ abdominal pain     Hepatic adenoma     Allergic rhinitis     Menorrhagia with regular cycle     Past Medical History:   Diagnosis Date     Abdominal pain      Allergic rhinitis, cause unspecified      Pelvic  "floor dysfunction 01/2018    Seeing pelvic floor physical therapist      Family History   Problem Relation Age of Onset     Family History Negative Mother      Family History Negative Father      Diabetes Other      paternal greatgrandmother     Family History Negative Brother      1     Cancer Maternal Grandmother      ? kidney cancer     Current Outpatient Prescriptions   Medication Sig Dispense Refill     ACIDOPHILUS OR CAPS 2 tabs per day of an UNK dose       albuterol (PROAIR HFA/PROVENTIL HFA/VENTOLIN HFA) 108 (90 Base) MCG/ACT inhaler Inhale 1-2 puffs into the lungs every 4 hours as needed (Cough, Wheezing, or Shortness of Breath) 2 Inhaler 1     B Complex Vitamins SOLN        CALCIUM 500 MG PO TABS 2 tab in the am       CO Q-10 100 MG PO CAPS 2 tab at night 30 Cap      EPINEPHrine (EPIPEN 2-ANNEMARIE) 0.3 MG/0.3ML injection Inject 0.3 mLs (0.3 mg) into the muscle once as needed for anaphylaxis 0.6 mL 1     fexofenadine (ALLEGRA) 180 MG tablet Take 1 tablet (180 mg) by mouth daily       Flaxseed, Linseed, (FLAXSEED OIL) 1000 MG CAPS 1 tsp daily       fluticasone (FLONASE) 50 MCG/ACT nasal spray Spray 1-2 sprays into both nostrils daily 16 g 11     Magnesium 400 MG CAPS Take 2 tablets by mouth.       MULTI-VITAMIN PO TABS ONE TABLET DAILY x 2x per day       OMEGA 3 1000 MG PO CAPS 3 CAPSULES DAILY WITH A MEAL       VITAMIN C 100 MG PO CHEW 1 TABLET 3 TIMES DAILY       VITAMIN D 1000 UNIT PO TABS 1 TABLET DAILY or PRN       [DISCONTINUED] ALBUTEROL INHALER 17GM PRN       Social History   Substance Use Topics     Smoking status: Never Smoker     Smokeless tobacco: Never Used     Alcohol use No       OBJECTIVE  /60 (BP Location: Right arm, Patient Position: Chair, Cuff Size: Adult Regular)  Pulse 58  Temp 98.9  F (37.2  C) (Oral)  Resp 16  Ht 5' 6\" (1.676 m)  Wt 137 lb 1.6 oz (62.2 kg)  SpO2 98%  Breastfeeding? No  BMI 22.13 kg/m2    Physical Exam    GENERAL APPEARANCE:  Awake, alert, and in no acute " distress.  Here today with her daughter.  PSYCHIATRIC:  Pleasant affect.  HEENT:  Sclera anicteric.  No conjunctivitis.  PERRLA.  Extraocular movements are intact.  Bilateral TM's and canals are within normal limits, with the exception of a slightly dull hue to the right TM.  Mild nasal congestion, with some clear postnasal drainage.  There is a bluish hue at the back of the right naris, suggestive of a polyp.  No erythema, edema, or exudates of the oral mucosa or posterior pharynx.  Mucous membranes moist.  NECK:  Spontaneous full range of motion.  No thyromegaly or mass.  No lymphadenopathy.  HEART:  Normal S1, S2.  Regular rate and rhythm.  No murmurs, rubs, or gallops.  LUNGS:  No respiratory distress.  No wheezes, rales, or rhonchi.  EXTREMITIES:  Moves 4 extremities.    NEUROLOGIC:  Gait within normal limits.  No facial droop or acute neurologic deficits.  SKIN:  No rash.    Labs:  No results found for this or any previous visit (from the past 24 hour(s)).    ASSESSMENT:      ICD-10-CM    1. Mild intermittent asthma without complication, well-controlled on as needed Albuterol. J45.20 albuterol (PROAIR HFA/PROVENTIL HFA/VENTOLIN HFA) 108 (90 Base) MCG/ACT inhaler   2. Nasal polyp, chronically on Flonase and Allegra. J33.9 OTOLARYNGOLOGY REFERRAL        PLAN:    Patient declines refills on Flonase and Allegra today.    Patient Instructions     Consider nasal breathing strips at night, only as directed.    Continue Flonase and Allegra, as before.    Follow-up with ENT if further concerns.      Continue Albuterol 1-2 puffs as needed.      Follow-up if worsening asthma symptoms, as discussed.    Discussed risks and benefits of treatment strategies, as noted in the Assessment and Plan sections.    The patient was discharged ambulatory and in stable condition post discussion of follow up.     Janay Steiner MD  [facility]

## 2018-11-28 NOTE — MR AVS SNAPSHOT
After Visit Summary   11/28/2018    Janelle Alston    MRN: [id number]           Patient Information     Date Of Birth          1975        Visit Information        Provider Department      11/28/2018 3:20 PM Janay Steiner MD [facility]        Today's Diagnoses     Mild intermittent asthma without complication    -  1    Nasal polyp          Care Instructions      Consider nasal breathing strips at night, only as directed.    Continue Flonase and Allegra, as before.    Follow-up with ENT if further concerns.      Continue Albuterol 1-2 puffs as needed.      Follow-up if worsening asthma symptoms, as discussed.            Follow-ups after your visit        Additional Services     OTOLARYNGOLOGY REFERRAL       Your provider has referred you to: [facility]: [facility] [phone]   [email or web address]    Please be aware that coverage of these services is subject to the terms and limitations of your health insurance plan.  Call member services at your health plan with any benefit or coverage questions.      Please bring the following with you to your appointment:    (1) Any X-Rays, CTs or MRIs which have been performed.  Contact the facility where they were done to arrange for  prior to your scheduled appointment.   (2) List of current medications  (3) This referral request   (4) Any documents/labs given to you for this referral                  Who to contact     If you have questions or need follow up information about today's clinic visit or your schedule please contact [facility] directly at [phone].  Normal or non-critical lab and imaging results will be communicated to you by MyChart, letter or phone within 4 business days after the clinic has received the results. If you do not hear from us within 7 days, please contact the clinic through [employer] or  "phone. If you have a critical or abnormal lab result, we will notify you by phone as soon as possible.  Submit refill requests through [employer] or call your pharmacy and they will forward the refill request to us. Please allow 3 business days for your refill to be completed.          Additional Information About Your Visit        [employer]hart Information     [employer] gives you secure access to your electronic health record. If you see a primary care provider, you can also send messages to your care team and make appointments. If you have questions, please call your primary care clinic.  If you do not have a primary care provider, please call [phone] and they will assist you.        Care EveryWhere ID     This is your Care EveryWhere ID. This could be used by other organizations to access your [place] medical records  [id number]        Your Vitals Were     Pulse Temperature Respirations Height Pulse Oximetry Breastfeeding?    58 98.9  F (37.2  C) (Oral) 16 5' 6\" (1.676 m) 98% No    BMI (Body Mass Index)                   22.13 kg/m2            Blood Pressure from Last 3 Encounters:   11/28/18 100/60   01/26/18 106/72   01/22/18 100/60    Weight from Last 3 Encounters:   11/28/18 137 lb 1.6 oz (62.2 kg)   01/26/18 146 lb (66.2 kg)   01/22/18 140 lb 13.9 oz (63.9 kg)              We Performed the Following     OTOLARYNGOLOGY REFERRAL          Today's Medication Changes          These changes are accurate as of 11/28/18 11:59 PM.  If you have any questions, ask your nurse or doctor.               Start taking these medicines.        Dose/Directions    albuterol 108 (90 Base) MCG/ACT inhaler   Commonly known as:  PROAIR HFA/PROVENTIL HFA/VENTOLIN HFA   Used for:  Mild intermittent asthma without complication   Started by:  Janay Steiner MD        Dose:  1-2 puff   Inhale 1-2 puffs into the lungs every 4 hours as needed (Cough, Wheezing, or Shortness of Breath)   Quantity:  2 Inhaler   Refills:  1 "         These medicines have changed or have updated prescriptions.        Dose/Directions    ALLEGRA 180 MG tablet   This may have changed:    - how much to take  - additional instructions   Generic drug:  fexofenadine   Changed by:  Janay Steiner MD        Dose:  180 mg   Take 1 tablet (180 mg) by mouth daily   Refills:  0            Where to get your medicines      These medications were sent to [facility] [address] [place] [address] [address] [facility] [address]  [address], [facility] [address]     Phone:  [phone]     albuterol 108 (90 Base) MCG/ACT inhaler                Primary Care Provider Office Phone # Fax #    Ramona Ann Aaseby-Aguilera, PA-C [phone] [address]  [facility] [address]        Equal Access to Services     ADELITA AGOSTO : Hadii aad ku hadasho Soomaali, waaxda luqadaha, qaybta kaalmada adeegyada, waxay idiin hayaan susana khyoon nam . So [facility] [phone].    ATENCIÓN: Si habla español, tiene a singh disposición servicios gratuitos de asistencia lingüística. Jess al [phone].    We comply with applicable federal civil rights laws and [place] laws. We do not discriminate on the basis of race, color, national origin, age, disability, sex, sexual orientation, or gender identity.            Thank you!     Thank you for choosing [facility]  for your care. Our goal is always to provide you with excellent care. Hearing back from our patients is one way we can continue to improve our services. Please take a few minutes to complete the written survey that you may receive in the mail after your visit with us. Thank you!             Your Updated Medication List - Protect others around you: Learn how to safely use, store and throw away your medicines at www.disposemymeds.org.          This list is accurate as of 11/28/18 11:59 PM.  Always use your most recent med list.                   Brand  Name Dispense Instructions for use Diagnosis    acidophilus Caps      2 tabs per day of an UNK dose        albuterol 108 (90 Base) MCG/ACT inhaler    PROAIR HFA/PROVENTIL HFA/VENTOLIN HFA    2 Inhaler    Inhale 1-2 puffs into the lungs every 4 hours as needed (Cough, Wheezing, or Shortness of Breath)    Mild intermittent asthma without complication       ALLEGRA 180 MG tablet   Generic drug:  fexofenadine      Take 1 tablet (180 mg) by mouth daily        B Complex Vitamins Soln           calcium carbonate 500 MG tablet    OS-LG     2 tab in the am        coenzyme Q-10 100 MG Caps     30 Cap    2 tab at night        EPINEPHrine 0.3 MG/0.3ML injection 2-pack    EPIPEN 2-ANNEMARIE    0.6 mL    Inject 0.3 mLs (0.3 mg) into the muscle once as needed for anaphylaxis    Hx of bee sting allergy       flaxseed oil 1000 MG Caps      1 tsp daily        fluticasone 50 MCG/ACT nasal spray    FLONASE    16 g    Spray 1-2 sprays into both nostrils daily    Nasal polyp       Magnesium 400 MG Caps      Take 2 tablets by mouth.        Multi-vitamin tablet   Generic drug:  multivitamin w/minerals      ONE TABLET DAILY x 2x per day        omega 3 1000 MG Caps      3 CAPSULES DAILY WITH A MEAL        vitamin C 100 MG Chew      1 TABLET 3 TIMES DAILY        vitamin D3 1000 units (25 mcg) tablet    CHOLECALCIFEROL     1 TABLET DAILY or PRN

## 2018-11-28 NOTE — PATIENT INSTRUCTIONS
Consider nasal breathing strips at night, only as directed.    Continue Flonase and Allegra, as before.    Follow-up with ENT if further concerns.      Continue Albuterol 1-2 puffs as needed.      Follow-up if worsening asthma symptoms, as discussed.

## 2018-11-29 ASSESSMENT — ASTHMA QUESTIONNAIRES: ACT_TOTALSCORE: 21

## 2019-04-24 ENCOUNTER — THERAPY VISIT (OUTPATIENT)
Dept: PHYSICAL THERAPY | Facility: CLINIC | Age: 44
End: 2019-04-24
Payer: COMMERCIAL

## 2019-04-24 DIAGNOSIS — M53.3 PAIN IN THE COCCYX: Primary | ICD-10-CM

## 2019-04-24 PROCEDURE — 97140 MANUAL THERAPY 1/> REGIONS: CPT | Mod: GP | Performed by: PHYSICAL THERAPIST

## 2019-04-24 PROCEDURE — 97535 SELF CARE MNGMENT TRAINING: CPT | Mod: GP | Performed by: PHYSICAL THERAPIST

## 2019-04-24 NOTE — PROGRESS NOTES
Subjective:  HPI                    Objective:  System    Physical Exam    General     ROS    Assessment/Plan:    PROGRESS  REPORT    Progress reporting period is from 04/24/19.       SUBJECTIVE  Subjective changes noted by patient:  .  Subjective: Been going pretty well over the past 6 months. Started noticing pulling in tailbone over the past month. Wanted to get on top of tailbone pain before it got worse. Slight discomfort after BM but not too bad yet.     Current pain level is 4/10  .     Previous pain level was  5/10 Initial Pain.  Changes in function:  Yes (See Goal flowsheet attached for changes in current functional level)  Adverse reaction to treatment or activity: activity - sitting    OBJECTIVE  Changes noted in objective findings:  Yes,   Objective: +TTP R puborectalis and external distal R side of sacrum/coccyx. Coccyx in flexed position, no L/R deviation.     ASSESSMENT/PLAN  Updated problem list and treatment plan: Diagnosis 1:  Coccyx pain  Pain -  hot/cold therapy, US, manual therapy, splint/taping/bracing/orthotics, self management, education and home program  Decreased ROM/flexibility - manual therapy and therapeutic exercise  Decreased joint mobility - manual therapy and therapeutic exercise  Decreased strength - therapeutic exercise and therapeutic activities  Decreased proprioception - neuro re-education and therapeutic activities  Inflammation - self management/home program  Impaired muscle performance - neuro re-education  Decreased function - therapeutic activities  Impaired posture - neuro re-education  STG/LTGs have been met or progress has been made towards goals:  Yes (See Goal flow sheet completed today.)  Assessment of Progress: The patient's condition has exacerbated.  Self Management Plans:  Patient has been instructed in a home treatment program.  Patient  has been instructed in self management of symptoms.  I have re-evaluated this patient and find that the nature, scope, duration  and intensity of the therapy is appropriate for the medical condition of the patient.  Janelle continues to require the following intervention to meet STG and LTG's:  PT    Recommendations:  This patient would benefit from continued therapy.     Frequency:  3 X a month, once daily  Duration:  for 1 months        Please refer to the daily flowsheet for treatment today, total treatment time and time spent performing 1:1 timed codes.

## 2019-05-01 ENCOUNTER — THERAPY VISIT (OUTPATIENT)
Dept: PHYSICAL THERAPY | Facility: CLINIC | Age: 44
End: 2019-05-01
Payer: COMMERCIAL

## 2019-05-01 DIAGNOSIS — M53.3 PAIN IN THE COCCYX: Primary | ICD-10-CM

## 2019-05-01 PROCEDURE — 97035 APP MDLTY 1+ULTRASOUND EA 15: CPT | Mod: GP | Performed by: PHYSICAL THERAPIST

## 2019-05-01 PROCEDURE — 97140 MANUAL THERAPY 1/> REGIONS: CPT | Mod: GP | Performed by: PHYSICAL THERAPIST

## 2019-05-08 ENCOUNTER — THERAPY VISIT (OUTPATIENT)
Dept: PHYSICAL THERAPY | Facility: CLINIC | Age: 44
End: 2019-05-08
Payer: COMMERCIAL

## 2019-05-08 DIAGNOSIS — M53.3 PAIN IN THE COCCYX: Primary | ICD-10-CM

## 2019-05-08 PROCEDURE — 97035 APP MDLTY 1+ULTRASOUND EA 15: CPT | Mod: GP | Performed by: PHYSICAL THERAPIST

## 2019-05-08 PROCEDURE — 97140 MANUAL THERAPY 1/> REGIONS: CPT | Mod: GP | Performed by: PHYSICAL THERAPIST

## 2019-05-22 ENCOUNTER — THERAPY VISIT (OUTPATIENT)
Dept: PHYSICAL THERAPY | Facility: CLINIC | Age: 44
End: 2019-05-22
Payer: COMMERCIAL

## 2019-05-22 DIAGNOSIS — M53.3 PAIN IN THE COCCYX: Primary | ICD-10-CM

## 2019-05-22 PROCEDURE — 97035 APP MDLTY 1+ULTRASOUND EA 15: CPT | Mod: GP | Performed by: PHYSICAL THERAPIST

## 2019-05-22 PROCEDURE — 97140 MANUAL THERAPY 1/> REGIONS: CPT | Mod: GP | Performed by: PHYSICAL THERAPIST

## 2019-05-22 NOTE — PROGRESS NOTES
Subjective:  HPI                    Objective:  System    Physical Exam    General     ROS    Assessment/Plan:    PROGRESS  REPORT    Progress reporting period is from 05/22/19.       SUBJECTIVE  Subjective changes noted by patient:  .  Subjective: Doing pretty well. 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     Current pain level is 2-3/10 .     Previous pain level was  6/10 Initial Pain level: 4/10.   Changes in function:  Yes (See Goal flowsheet attached for changes in current functional level)  Adverse reaction to treatment or activity: activity - sitting hard surface    OBJECTIVE  Changes noted in objective findings:  Yes,   Objective: F/u in 4-6 weeks. Able to palpate distal end of coccyx without going internally, so less flexed.     ASSESSMENT/PLAN  Updated problem list and treatment plan: Diagnosis 1:  Coccyx ain  Pain -  hot/cold therapy, US, manual therapy, splint/taping/bracing/orthotics, self management, education, directional preference exercise and home program  Decreased ROM/flexibility - manual therapy and therapeutic exercise  Decreased joint mobility - manual therapy and therapeutic exercise  Decreased proprioception - neuro re-education and therapeutic activities  Inflammation - self management/home program  Impaired muscle performance - neuro re-education  Decreased function - therapeutic activities  Impaired posture - neuro re-education  STG/LTGs have been met or progress has been made towards goals:  Yes (See Goal flow sheet completed today.)  Assessment of Progress: The patient's condition is improving.  Self Management Plans:  Patient has been instructed in a home treatment program.  Patient  has been instructed in self management of symptoms.    Janelle continues to require the following intervention to meet STG and LTG's:  PT    Recommendations:  This patient would benefit from continued therapy.     Frequency:  1 X a month, once  daily  Duration:  for 1 months        Please refer to the daily flowsheet for treatment today, total treatment time and time spent performing 1:1 timed codes.

## 2019-07-10 ENCOUNTER — THERAPY VISIT (OUTPATIENT)
Dept: PHYSICAL THERAPY | Facility: CLINIC | Age: 44
End: 2019-07-10
Payer: COMMERCIAL

## 2019-07-10 DIAGNOSIS — M53.3 PAIN IN THE COCCYX: Primary | ICD-10-CM

## 2019-07-10 PROCEDURE — 97140 MANUAL THERAPY 1/> REGIONS: CPT | Mod: GP | Performed by: PHYSICAL THERAPIST

## 2019-07-10 PROCEDURE — 97035 APP MDLTY 1+ULTRASOUND EA 15: CPT | Mod: GP | Performed by: PHYSICAL THERAPIST

## 2019-07-22 ENCOUNTER — HOSPITAL ENCOUNTER (EMERGENCY)
Facility: CLINIC | Age: 44
Discharge: HOME | End: 2019-07-22
Attending: EMERGENCY MEDICINE | Admitting: EMERGENCY MEDICINE
Payer: COMMERCIAL

## 2019-07-22 ENCOUNTER — TELEPHONE (OUTPATIENT)
Dept: FAMILY MEDICINE | Facility: CLINIC | Age: 44
End: 2019-07-22

## 2019-07-22 ENCOUNTER — APPOINTMENT (OUTPATIENT)
Dept: GENERAL RADIOLOGY | Facility: CLINIC | Age: 44
End: 2019-07-22
Attending: EMERGENCY MEDICINE
Payer: COMMERCIAL

## 2019-07-22 VITALS
DIASTOLIC BLOOD PRESSURE: 82 MMHG | TEMPERATURE: 97.8 F | WEIGHT: 135 LBS | SYSTOLIC BLOOD PRESSURE: 112 MMHG | OXYGEN SATURATION: 96 % | HEART RATE: 67 BPM | HEIGHT: 66 IN | BODY MASS INDEX: 21.69 KG/M2 | RESPIRATION RATE: 16 BRPM

## 2019-07-22 DIAGNOSIS — R42 DIZZINESS: ICD-10-CM

## 2019-07-22 DIAGNOSIS — R07.9 CHEST PAIN, UNSPECIFIED TYPE: ICD-10-CM

## 2019-07-22 LAB
ANION GAP SERPL CALCULATED.3IONS-SCNC: 5 MMOL/L (ref 3–14)
BASOPHILS # BLD AUTO: 0.1 10E9/L (ref 0–0.2)
BASOPHILS NFR BLD AUTO: 1.1 %
BUN SERPL-MCNC: 17 MG/DL (ref 7–30)
CALCIUM SERPL-MCNC: 8.6 MG/DL (ref 8.5–10.1)
CHLORIDE SERPL-SCNC: 107 MMOL/L (ref 94–109)
CO2 SERPL-SCNC: 28 MMOL/L (ref 20–32)
CREAT SERPL-MCNC: 0.68 MG/DL (ref 0.52–1.04)
DIFFERENTIAL METHOD BLD: NORMAL
EOSINOPHIL # BLD AUTO: 0.3 10E9/L (ref 0–0.7)
EOSINOPHIL NFR BLD AUTO: 5.4 %
ERYTHROCYTE [DISTWIDTH] IN BLOOD BY AUTOMATED COUNT: 13.1 % (ref 10–15)
GFR SERPL CREATININE-BSD FRML MDRD: >90 ML/MIN/{1.73_M2}
GLUCOSE SERPL-MCNC: 79 MG/DL (ref 70–99)
HCT VFR BLD AUTO: 41 % (ref 35–47)
HGB BLD-MCNC: 13.7 G/DL (ref 11.7–15.7)
IMM GRANULOCYTES # BLD: 0 10E9/L (ref 0–0.4)
IMM GRANULOCYTES NFR BLD: 0.3 %
INTERPRETATION ECG - MUSE: NORMAL
LYMPHOCYTES # BLD AUTO: 1.8 10E9/L (ref 0.8–5.3)
LYMPHOCYTES NFR BLD AUTO: 29.9 %
MCH RBC QN AUTO: 30.5 PG (ref 26.5–33)
MCHC RBC AUTO-ENTMCNC: 33.4 G/DL (ref 31.5–36.5)
MCV RBC AUTO: 91 FL (ref 78–100)
MONOCYTES # BLD AUTO: 0.4 10E9/L (ref 0–1.3)
MONOCYTES NFR BLD AUTO: 7 %
NEUTROPHILS # BLD AUTO: 3.5 10E9/L (ref 1.6–8.3)
NEUTROPHILS NFR BLD AUTO: 56.3 %
NRBC # BLD AUTO: 0 10*3/UL
NRBC BLD AUTO-RTO: 0 /100
PLATELET # BLD AUTO: 246 10E9/L (ref 150–450)
POTASSIUM SERPL-SCNC: 4.2 MMOL/L (ref 3.4–5.3)
RBC # BLD AUTO: 4.49 10E12/L (ref 3.8–5.2)
SODIUM SERPL-SCNC: 140 MMOL/L (ref 133–144)
TROPONIN I SERPL-MCNC: <0.015 UG/L (ref 0–0.04)
TSH SERPL DL<=0.005 MIU/L-ACNC: 1.02 MU/L (ref 0.4–4)
WBC # BLD AUTO: 6.2 10E9/L (ref 4–11)

## 2019-07-22 PROCEDURE — 85025 COMPLETE CBC W/AUTO DIFF WBC: CPT | Performed by: EMERGENCY MEDICINE

## 2019-07-22 PROCEDURE — 84443 ASSAY THYROID STIM HORMONE: CPT | Performed by: EMERGENCY MEDICINE

## 2019-07-22 PROCEDURE — 80048 BASIC METABOLIC PNL TOTAL CA: CPT | Performed by: EMERGENCY MEDICINE

## 2019-07-22 PROCEDURE — 84484 ASSAY OF TROPONIN QUANT: CPT | Performed by: EMERGENCY MEDICINE

## 2019-07-22 PROCEDURE — 71046 X-RAY EXAM CHEST 2 VIEWS: CPT

## 2019-07-22 PROCEDURE — 99285 EMERGENCY DEPT VISIT HI MDM: CPT | Mod: 25

## 2019-07-22 PROCEDURE — 93005 ELECTROCARDIOGRAM TRACING: CPT

## 2019-07-22 ASSESSMENT — ENCOUNTER SYMPTOMS
CHEST TIGHTNESS: 1
DIZZINESS: 1

## 2019-07-22 ASSESSMENT — MIFFLIN-ST. JEOR: SCORE: 1269.11

## 2019-07-22 NOTE — TELEPHONE ENCOUNTER
Pt calling into clinic  c/o for past couple months has been having trouble completing work outs, works out daily, has to sit down, trouble walking up stairs  Worse when working out or stands up, notes tunnel vision, HA, dizzy, resting heart rate 39, some chest pain  No processed foods or sugar, taking electrolyte supplements, drinks plenty of water  Not on any medications    Advised ER due to HR and chest pain, agrees to plan    Marisa Montemayor RN, BS  Clinical Nurse Triage.

## 2019-07-22 NOTE — ED TRIAGE NOTES
Patient states for the past month she has had worsening dizziness and palpitations after exertion.      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      ABCs intact.  Alert and oriented x 3.

## 2019-07-22 NOTE — ED AVS SNAPSHOT
[facility] Emergency Department  [address]  [facility] [address]  Phone:  [phone]  Fax:  [phone]                                    Janelle Alston   MRN: [id number]    Department:  [facility] Emergency Department   Date of Visit:  7/22/2019           After Visit Summary Signature Page    I have received my discharge instructions, and my questions have been answered. I have discussed any challenges I see with this plan with the nurse or doctor.    ..........................................................................................................................................  Patient/Patient Representative Signature      ..........................................................................................................................................  Patient Representative Print Name and Relationship to Patient    ..................................................               ................................................  Date                                   Time    ..........................................................................................................................................  Reviewed by Signature/Title    ...................................................              ..............................................  Date                                               Time          22EPIC Rev 08/18

## 2019-07-22 NOTE — ED PROVIDER NOTES
History     Chief Complaint:  Dizziness    The history is provided by the patient.      Janelle Alston is a 44 year old female, with a history of asthma, who presents with dizziness. For the past month, patient has had worsening dizziness during exercise. Patient states there have been a few episodes during exercise where she has felt palpitations. She also states that after exercise she has had a few episodes where she feels like she is going to faint and has to lay down. Recently, patient has been more fatigued and has been unable to exercise. She notes that she can not make it up the stairs without feeling dizzy nor can she stand up and sit down at [employer] without feeling dizzy as well. 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 Patient was concerned today due to being unable to work, prompting her to the ED. Here, patient states that she has had episodes of chest tightness and headache. No episodes of syncope or chest pain.     Allergies:  Amoxicillin  Ciproflox  Clindamycin  Erythromycin  Penicillins  Sulfa drugs  Tetracycline    Medications:    Acidophilus  Albuterol   Vitamin B complex  Calcium   Co Q-10  Epipen  Allegra  Flaxseed oil   Flonase  Magnesium   Multivitamin  Omega 3  Vitamin D  Vitamin C    Past Medical History:    Menorrhagia with regular cycle   Allergic rhinitis  Hepatic adenoma  Asthma   Liver nodule   Raynauds phenomenon  Dermatitis     Past Surgical History:    Abdominoplasty    D and C, hysterectomy, ablation, combined  Tooth extraction  Laparoscopy     Family History:    Depression    Social History:  Negative for tobacco use.  Negative for alcohol use.  Negative for drug use.  Marital Status:  .     Review of Systems   Respiratory: Positive for chest tightness.    Cardiovascular: Negative for chest pain.   Neurological: Positive for dizziness. Negative for syncope.   All other systems reviewed and are negative.      Physical Exam  "    Patient Vitals for the past 24 hrs:   BP Temp Temp src Pulse Heart Rate Resp SpO2 Height Weight   07/22/19 1300 -- -- -- -- 55 -- 96 % -- --   07/22/19 1113 112/82 97.8  F (36.6  C) Oral 67 -- 16 100 % 1.676 m (5' 6\") 61.2 kg (135 lb)     Physical Exam  Constitutional: Alert, attentive  HENT:    Nose: Nose normal.    Mouth/Throat: Oropharynx is clear, mucous membranes are moist   Eyes: EOM are normal.   CV: bradycardic, regular rhythm; no murmurs, rubs or gallups  Chest: Effort normal and breath sounds normal.   GI:  There is no tenderness. No distension. Normal bowel sounds  MSK: Normal range of motion.   Neurological: Alert, attentive  Skin: Skin is warm and dry.      Emergency Department Course   ECG:  Indication: Dizziness  Time: 66  Vent. Rate 156 bpm. CA interval 82. QRS duration 426/446. QT/QTc 78. P-R-T axis 68 53  Normal sinus rhythm. Normal ECG. Agrees with computer interpretation. Read time: 1113.    Imaging:   Radiographic findings were communicated with the patient who voiced understanding of the findings.    XR Chest 2 Views   Final Result   IMPRESSION: Normal two views of the chest.      DAPHNE CORDOBA MD        Laboratory:  CBC: WBC: 6.2, HGB: 13.7, PLT: 246  BMP: WNL (Creatinine: 0.68)  TSH with free T4 reflex: 1.02    Emergency Department Course:  1120 Nursing notes and vitals reviewed. I performed an exam of the patient as documented above.     Blood drawn. This was sent to the lab for further testing, results above.    EKG obtained in the ED, see results above.     The patient was sent for a chest XR while here in the emergency department.     1312 I rechecked the patient and discussed the results of her workup thus far.     Findings and plan explained to the Patient. Patient discharged home with instructions regarding supportive care, medications, and reasons to return. The importance of close follow-up was reviewed.     I personally reviewed the laboratory results with the Patient and " answered all related questions prior to discharge.     Impression & Plan      Medical Decision Making:  This is 44-year-old previously healthy female who presents for evaluation of dizziness and chest tightness that is exertional.  Typically symptoms began after exertion but she is also noticed exertional fatigue recently.  Here she is asymptomatic but did have symptoms early this morning. Her cardiopulmono-exam. She is PERC negative, essentially ruling out PE.  Given several hours since atypical symptoms, her negative EKG and troponin essentially rule out AMI.  Chest x-ray shows no widened mediastinum, pneumothorax, pneumonia.  There are no hematologic, metabolic, or thyroid related abnormality to explain her symptoms.  I recommended outpatient stress echocardiogram as this could represent atypical but evolving angina versus valvular abnormality.  She should follow-up with primary care in 2 to 3 days to review stress test findings.  She should return immediately for worse pain, shortness of breath, dizziness, or any other concerning symptoms.  I recommend avoidance of exercise until completing stress test.    Diagnosis:    ICD-10-CM    1. Chest pain, unspecified type R07.9 Echo Stress Echocardiogram   2. Dizziness R42      Disposition:  discharged to home    Scribe Disposition  I, Diana Helm, am serving as a scribe on 7/22/2019 at 11:27 AM to personally document services performed by Costa Moreno MD based on my observations and the provider's statements to me.     Diana Helm  7/22/2019   [facility] EMERGENCY DEPARTMENT       Costa Moreno MD  07/22/19 7174

## 2019-08-07 ENCOUNTER — HOSPITAL ENCOUNTER (OUTPATIENT)
Dept: CARDIOLOGY | Facility: CLINIC | Age: 44
Discharge: HOME | End: 2019-08-07
Attending: EMERGENCY MEDICINE | Admitting: EMERGENCY MEDICINE
Payer: COMMERCIAL

## 2019-08-07 DIAGNOSIS — R07.9 CHEST PAIN, UNSPECIFIED TYPE: ICD-10-CM

## 2019-08-07 PROCEDURE — 93018 CV STRESS TEST I&R ONLY: CPT | Performed by: INTERNAL MEDICINE

## 2019-08-07 PROCEDURE — 93350 STRESS TTE ONLY: CPT | Mod: 26 | Performed by: INTERNAL MEDICINE

## 2019-08-07 PROCEDURE — 93325 DOPPLER ECHO COLOR FLOW MAPG: CPT | Mod: TC

## 2019-08-07 PROCEDURE — 93325 DOPPLER ECHO COLOR FLOW MAPG: CPT | Mod: 26 | Performed by: INTERNAL MEDICINE

## 2019-08-07 PROCEDURE — 93321 DOPPLER ECHO F-UP/LMTD STD: CPT | Mod: 26 | Performed by: INTERNAL MEDICINE

## 2019-08-07 PROCEDURE — 93016 CV STRESS TEST SUPVJ ONLY: CPT | Performed by: INTERNAL MEDICINE

## 2019-08-28 ENCOUNTER — THERAPY VISIT (OUTPATIENT)
Dept: PHYSICAL THERAPY | Facility: CLINIC | Age: 44
End: 2019-08-28
Payer: COMMERCIAL

## 2019-08-28 DIAGNOSIS — M53.3 PAIN IN THE COCCYX: Primary | ICD-10-CM

## 2019-08-28 PROCEDURE — 97035 APP MDLTY 1+ULTRASOUND EA 15: CPT | Mod: GP | Performed by: PHYSICAL THERAPIST

## 2019-08-28 PROCEDURE — 97140 MANUAL THERAPY 1/> REGIONS: CPT | Mod: GP | Performed by: PHYSICAL THERAPIST

## 2019-08-28 NOTE — PROGRESS NOTES
Subjective:  HPI                    Objective:  System    Physical Exam    General     ROS    Assessment/Plan:    PROGRESS  REPORT    Progress reporting period is from 08/28/19.       SUBJECTIVE  Subjective changes noted by patient:  .  Subjective: 10 min late. Overall holding steady with tailbone pain over 6-7 weeks. Just the last week started noticing some tightness in coccyx only after BM's. Not constipated at all and stool is normal so not feeling like she is pushing. Sitting is fine.     Current pain level is 2/10  .     Previous pain level was   Initial Pain level: 4/10.   Changes in function:  Yes (See Goal flowsheet attached for changes in current functional level)  Adverse reaction to treatment or activity: None    OBJECTIVE  Changes noted in objective findings:  Yes,   Objective: Tightness puborectalis and coccyx in flexion.      ASSESSMENT/PLAN  Updated problem list and treatment plan: Diagnosis 1:  Coccyx/pelvic pain  Pain -  hot/cold therapy, US, manual therapy, splint/taping/bracing/orthotics, self management, education, directional preference exercise and home program  Decreased ROM/flexibility - manual therapy and therapeutic exercise  Decreased joint mobility - manual therapy and therapeutic exercise  Decreased strength - therapeutic exercise and therapeutic activities  Decreased proprioception - neuro re-education and therapeutic activities  Inflammation - self management/home program  Impaired muscle performance - neuro re-education  Decreased function - therapeutic activities  Impaired posture - neuro re-education  STG/LTGs have been met or progress has been made towards goals:  Yes (See Goal flow sheet completed today.)  Assessment of Progress: The patient's condition is improving.  Self Management Plans:  Patient has been instructed in a home treatment program.  Patient  has been instructed in self management of symptoms.    Janelle continues to require the following intervention to meet STG and  LTG's:  PT    Recommendations:  This patient would benefit from continued therapy.     Frequency:  1 X a month, once daily  Duration:  for 3 months        Please refer to the daily flowsheet for treatment today, total treatment time and time spent performing 1:1 timed codes.

## 2019-11-03 ENCOUNTER — HEALTH MAINTENANCE LETTER (OUTPATIENT)
Age: 44
End: 2019-11-03

## 2020-10-25 ENCOUNTER — OFFICE VISIT (OUTPATIENT)
Dept: URGENT CARE | Facility: URGENT CARE | Age: 45
End: 2020-10-25
Payer: COMMERCIAL

## 2020-10-25 VITALS
BODY MASS INDEX: 22.27 KG/M2 | OXYGEN SATURATION: 98 % | WEIGHT: 138 LBS | TEMPERATURE: 97.7 F | SYSTOLIC BLOOD PRESSURE: 98 MMHG | HEART RATE: 52 BPM | DIASTOLIC BLOOD PRESSURE: 68 MMHG

## 2020-10-25 DIAGNOSIS — R10.11 RUQ ABDOMINAL PAIN: Primary | ICD-10-CM

## 2020-10-25 LAB
BASOPHILS # BLD AUTO: 0 10E9/L (ref 0–0.2)
BASOPHILS NFR BLD AUTO: 0.5 %
DIFFERENTIAL METHOD BLD: NORMAL
EOSINOPHIL # BLD AUTO: 0.1 10E9/L (ref 0–0.7)
EOSINOPHIL NFR BLD AUTO: 2.3 %
ERYTHROCYTE [DISTWIDTH] IN BLOOD BY AUTOMATED COUNT: 13.4 % (ref 10–15)
HCT VFR BLD AUTO: 38.6 % (ref 35–47)
HGB BLD-MCNC: 12.7 G/DL (ref 11.7–15.7)
LYMPHOCYTES # BLD AUTO: 1.6 10E9/L (ref 0.8–5.3)
LYMPHOCYTES NFR BLD AUTO: 28.1 %
MCH RBC QN AUTO: 30.7 PG (ref 26.5–33)
MCHC RBC AUTO-ENTMCNC: 32.9 G/DL (ref 31.5–36.5)
MCV RBC AUTO: 93 FL (ref 78–100)
MONOCYTES # BLD AUTO: 0.4 10E9/L (ref 0–1.3)
MONOCYTES NFR BLD AUTO: 7 %
NEUTROPHILS # BLD AUTO: 3.5 10E9/L (ref 1.6–8.3)
NEUTROPHILS NFR BLD AUTO: 62.1 %
PLATELET # BLD AUTO: 197 10E9/L (ref 150–450)
RBC # BLD AUTO: 4.14 10E12/L (ref 3.8–5.2)
WBC # BLD AUTO: 5.6 10E9/L (ref 4–11)

## 2020-10-25 PROCEDURE — 85025 COMPLETE CBC W/AUTO DIFF WBC: CPT | Performed by: PHYSICIAN ASSISTANT

## 2020-10-25 PROCEDURE — 83690 ASSAY OF LIPASE: CPT | Performed by: PHYSICIAN ASSISTANT

## 2020-10-25 PROCEDURE — 80053 COMPREHEN METABOLIC PANEL: CPT | Performed by: PHYSICIAN ASSISTANT

## 2020-10-25 PROCEDURE — 99214 OFFICE O/P EST MOD 30 MIN: CPT | Performed by: PHYSICIAN ASSISTANT

## 2020-10-25 ASSESSMENT — ENCOUNTER SYMPTOMS
SHORTNESS OF BREATH: 0
HEADACHES: 0
RHINORRHEA: 0
SORE THROAT: 0
COUGH: 0
VOMITING: 0
NAUSEA: 0
FEVER: 0
DIARRHEA: 0
CHILLS: 0
ABDOMINAL PAIN: 1

## 2020-10-25 NOTE — PROGRESS NOTES
HPI:  Janelle Alston is a 45 year old female who presents for evaluation of moderate RUQ pain onset 3 weeks ago. She has also noticed some occasional early satiety and more frequent belching, as well as mild & brief pain in her mid back. Symptoms are constant in duration with no known aggravating or alleviating factors. No treatments tried. Patient reports no unintentional weight loss, night sweats, fever/chills, chest pain, shortness of breath, nausea, vomiting, diarrhea, constipation, rash, bloating, or any other symptoms. No history of gallstones or kidney stones.      Past Medical History:   Diagnosis Date     Abdominal pain      Allergic rhinitis, cause unspecified      Pelvic floor dysfunction 2018    Seeing pelvic floor physical therapist      Past Surgical History:   Procedure Laterality Date     ABDOMINOPLASTY       C  DELIVERY ONLY       and      DILATION AND CURETTAGE, HYSTEROSCOPY, ABLATE ENDOMETRIUM NOVASURE, COMBINED N/A 2016    Procedure: COMBINED DILATION AND CURETTAGE, HYSTEROSCOPY, ABLATE ENDOMETRIUM NOVASURE;  Surgeon: Isak Eaton MD;  Location: [facility] OR     HC TOOTH EXTRACTION W/FORCEP       SURGICAL HISTORY OF -       laproscopy -abd-for endometriosis     Social History     Tobacco Use     Smoking status: Never Smoker     Smokeless tobacco: Never Used   Substance Use Topics     Alcohol use: No     Family History   Problem Relation Age of Onset     Family History Negative Mother      Family History Negative Father      Diabetes Other         paternal greatgrandmother     Family History Negative Brother         1     Cancer Maternal Grandmother         ? kidney cancer        Review of Systems   Constitutional: Negative for chills and fever.   HENT: Negative for congestion, ear pain, rhinorrhea and sore throat.    Respiratory: Negative for cough and shortness of breath.    Gastrointestinal: Positive for abdominal pain. Negative for diarrhea, nausea and  vomiting.        Belching, early satiety   Neurological: Negative for headaches.       Vitals:    10/25/20 1450   BP: 98/68   BP Location: Right arm   Patient Position: Chair   Cuff Size: Adult Regular   Pulse: 52   Temp: 97.7  F (36.5  C)   TempSrc: Tympanic   SpO2: 98%   Weight: 62.6 kg (138 lb)       Physical Exam  Vitals signs and nursing note reviewed.   HENT:      Head: Normocephalic and atraumatic.   Cardiovascular:      Rate and Rhythm: Normal rate and regular rhythm.      Heart sounds: Normal heart sounds.   Pulmonary:      Effort: Pulmonary effort is normal.      Breath sounds: Normal breath sounds.   Abdominal:      General: Abdomen is flat. Bowel sounds are normal.      Palpations: Abdomen is soft. There is no pulsatile mass.      Tenderness: There is no right CVA tenderness, left CVA tenderness, guarding or rebound.       Musculoskeletal: Normal range of motion.   Skin:     General: Skin is warm and dry.   Neurological:      Mental Status: She is alert and oriented to person, place, and time.         Labs/Imaging:  No results found for this or any previous visit (from the past 24 hour(s)).      Clinical Decision Making:    No weight loss or night sweats to suggest malignancy. No urinary symptoms or CVAT to suggest UTI or nephrolithiasis. Mild localized tenderness near RUQ/epigastric region. No peritoneal signs or fever. Differential diagnosis includes cholecystitis, choledocholithiasis, nephrolithiasis, UTI, malignancy, acute pancreatitis, GERD/gastritis/PUD, abdominal muscle strain. Labs ordered to rule out anemia and significant leukocytosis, evaluate renal/hepatic function and lipase- will contact patient if abnormal. Discussed that I felt symptoms are most likely due to gastritis/GERD versus cholecystitis. Advised trial of omeprazole but patient declines as she does not like taking medications and prefers to try more natural therapies. Discussed lifestyle changes she could work on, and advised  follow-up with PCP if symptoms persist. May benefit from H. Pylori testing and RUQ ultrasound if persists. Go to ER if pain worsens or develops fever.    See patient instructions below.    At the end of the encounter, I discussed results, diagnosis, medications. Discussed red flags for immediate return to clinic/ER, as well as indications for follow up if no improvement. Patient understood and agreed to plan. Patient was stable for discharge.      ICD-10-CM    1. RUQ abdominal pain  R10.11 CBC with platelets and differential     Comprehensive metabolic panel (BMP + Alb, Alk Phos, ALT, AST, Total. Bili, TP)     Lipase         If not improving or if condition worsens, follow up with your Primary Care Provider    MAXIMILIANO Bermudez, NANCY  [facility]    Patient Instructions   Gastritis    Gastritis is inflammation and irritation of the stomach lining. You can have it for a short time (acute) or be long lasting (chronic). Infection with bacteria called H pylori most often causes gastritis. More than a third of people in the  have these bacteria in their bodies. In many cases, H pylori causes no problems or symptoms. In some people, though, the infection irritates the stomach lining and causes gastritis. H. pylori may be diagnosed through blood, stool, or breath tests, we well as through biopsy during an endoscopy. Other causes of stomach irritation include drinking alcohol, smoking or chewing tobacco, or taking pain-relieving medicines called NSAIDs (such as aspirin or ibuprofen). Certain drugs (such as cocaine) and immune conditions can also cause gastritis.  Symptoms of gastritis can include:    Belly pain or bloating    Feeling full quickly    Loss of appetite    Nausea or vomiting    Vomiting blood or having black stools    Feeling more tired than usual  An inflamed and irritated stomach lining is more likely to develop a sore called an ulcer. To help prevent this, gastritis should  "be treated.  Home care  Lifestyle changes can help reduce symptoms. If needed, your healthcare provider may prescribe medicines. Symptoms often improve with treatment, but if treatment is stopped, the symptoms often return after a few months. So most persons with GERD will need to continue treatment or get treatment on and off.  Lifestyle changes    Drinking six to eight glasses of water daily and eating high-fiber, low-fat foods (for example, fruits, vegetables, and whole-grain breads and cereals) are recommended. Drinking carbonated beverages, alcohol, and caffeinated beverages; eating high-fat and spicy foods; and smoking are discouraged. Some foods, such as beans, cabbage, and cauliflower, naturally produce gas and should be limited or avoided. Meals that are small, regular, and frequent are encouraged because they are easier to digest than large ones.     Don t eat large meals, especially at night. Frequent, smaller meals are best. Don't lie down right after eating. And don t eat anything 3 hours before going to bed.    Don't drink alcohol or smoke. As much as possible, stay away from second hand smoke.    If you are overweight, losing weight will reduce symptoms.     Don't wear tight clothing around your stomach area.    If your symptoms occur during sleep, use a foam wedge to elevate your upper body (not just your head.) Or, place 4\" blocks under the head of your bed. Or use 2 bed risers under your bedframe.  Medicines  If needed, medicines can help relieve the symptoms of GERD and prevent damage to the esophagus. Discuss a medicine plan with your healthcare provider. This may include one or more of the following medicines:    Antacids to help neutralize the normal acids in your stomach.    Acid blockers (Histamine or H2 blockers) to decrease acid production.    Acid inhibitors (proton pump inhibitors PPIs) to decrease acid production in a different way than the blockers. They may work better, but can take a " little longer to take effect.  Take an antacid 30 to 60 minutes after eating and at bedtime, but not at the same time as an acid blocker.  Try not to take medicines such as ibuprofen and aspirin. If you are taking aspirin for your heart or other medical reasons, talk to your healthcare provider about stopping it.If needed, our healthcare provider may prescribe medicines. If you have H pylori infection, treating it will likely relieve your symptoms. Other changes can help reduce stomach irritation and help it heal.    If you have been prescribed medicines for H pylori infection, take them as directed. Take all of the medicine until it is finished or your healthcare provider tells you to stop, even if you feel better.  Follow-up care  Follow up with your healthcare provider, or as advised by our staff. You may need testing to check for inflammation or an ulcer.  When to seek medical advice  Call your healthcare provider for any of the following:    Stomach pain that gets worse or moves to the lower right belly (appendix area)    Chest pain that appears or gets worse, or spreads to the back, neck, shoulder, or arm    Frequent vomiting (can t keep down liquids)    Blood in the stool or vomit (red or black in color)    Feeling weak or dizzy    Shortness of breath    Unexplained weight loss    Fever of 100.4 F (38 C) or higher, or as directed by your healthcare provider  Date Last Reviewed: 3/1/2018    8764-3327 [employer]. [address] [place] [address], [place] [address]. All rights reserved. This information is not intended as a substitute for professional medical care. Always follow your healthcare professional's instructions.

## 2020-10-25 NOTE — PATIENT INSTRUCTIONS
Gastritis    Gastritis is inflammation and irritation of the stomach lining. You can have it for a short time (acute) or be long lasting (chronic). Infection with bacteria called H pylori most often causes gastritis. More than a third of people in the [place] have these bacteria in their bodies. In many cases, H pylori causes no problems or symptoms. In some people, though, the infection irritates the stomach lining and causes gastritis. H. pylori may be diagnosed through blood, stool, or breath tests, we well as through biopsy during an endoscopy. Other causes of stomach irritation include drinking alcohol, smoking or chewing tobacco, or taking pain-relieving medicines called NSAIDs (such as aspirin or ibuprofen). Certain drugs (such as cocaine) and immune conditions can also cause gastritis.  Symptoms of gastritis can include:    Belly pain or bloating    Feeling full quickly    Loss of appetite    Nausea or vomiting    Vomiting blood or having black stools    Feeling more tired than usual  An inflamed and irritated stomach lining is more likely to develop a sore called an ulcer. To help prevent this, gastritis should be treated.  Home care  Lifestyle changes can help reduce symptoms. If needed, your healthcare provider may prescribe medicines. Symptoms often improve with treatment, but if treatment is stopped, the symptoms often return after a few months. So most persons with GERD will need to continue treatment or get treatment on and off.  Lifestyle changes    Drinking six to eight glasses of water daily and eating high-fiber, low-fat foods (for example, fruits, vegetables, and whole-grain breads and cereals) are recommended. Drinking carbonated beverages, alcohol, and caffeinated beverages; eating high-fat and spicy foods; and smoking are discouraged. Some foods, such as beans, cabbage, and cauliflower, naturally produce gas and should be limited or avoided. Meals that are small, regular, and frequent are  "encouraged because they are easier to digest than large ones.     Don t eat large meals, especially at night. Frequent, smaller meals are best. Don't lie down right after eating. And don t eat anything 3 hours before going to bed.    Don't drink alcohol or smoke. As much as possible, stay away from second hand smoke.    If you are overweight, losing weight will reduce symptoms.     Don't wear tight clothing around your stomach area.    If your symptoms occur during sleep, use a foam wedge to elevate your upper body (not just your head.) Or, place 4\" blocks under the head of your bed. Or use 2 bed risers under your bedframe.  Medicines  If needed, medicines can help relieve the symptoms of GERD and prevent damage to the esophagus. Discuss a medicine plan with your healthcare provider. This may include one or more of the following medicines:    Antacids to help neutralize the normal acids in your stomach.    Acid blockers (Histamine or H2 blockers) to decrease acid production.    Acid inhibitors (proton pump inhibitors PPIs) to decrease acid production in a different way than the blockers. They may work better, but can take a little longer to take effect.  Take an antacid 30 to 60 minutes after eating and at bedtime, but not at the same time as an acid blocker.  Try not to take medicines such as ibuprofen and aspirin. If you are taking aspirin for your heart or other medical reasons, talk to your healthcare provider about stopping it.If needed, our healthcare provider may prescribe medicines. If you have H pylori infection, treating it will likely relieve your symptoms. Other changes can help reduce stomach irritation and help it heal.    If you have been prescribed medicines for H pylori infection, take them as directed. Take all of the medicine until it is finished or your healthcare provider tells you to stop, even if you feel better.  Follow-up care  Follow up with your healthcare provider, or as advised by our " staff. You may need testing to check for inflammation or an ulcer.  When to seek medical advice  Call your healthcare provider for any of the following:    Stomach pain that gets worse or moves to the lower right belly (appendix area)    Chest pain that appears or gets worse, or spreads to the back, neck, shoulder, or arm    Frequent vomiting (can t keep down liquids)    Blood in the stool or vomit (red or black in color)    Feeling weak or dizzy    Shortness of breath    Unexplained weight loss    Fever of 100.4 F (38 C) or higher, or as directed by your healthcare provider  Date Last Reviewed: 3/1/2018    [id number] [employer]. [address] [place] [address], [place] [address]. All rights reserved. This information is not intended as a substitute for professional medical care. Always follow your healthcare professional's instructions.

## 2020-10-26 LAB
ALBUMIN SERPL-MCNC: 3.5 G/DL (ref 3.4–5)
ALP SERPL-CCNC: 44 U/L (ref 40–150)
ALT SERPL W P-5'-P-CCNC: 29 U/L (ref 0–50)
ANION GAP SERPL CALCULATED.3IONS-SCNC: 5 MMOL/L (ref 3–14)
AST SERPL W P-5'-P-CCNC: 25 U/L (ref 0–45)
BILIRUB SERPL-MCNC: 0.5 MG/DL (ref 0.2–1.3)
BUN SERPL-MCNC: 20 MG/DL (ref 7–30)
CALCIUM SERPL-MCNC: 8.6 MG/DL (ref 8.5–10.1)
CHLORIDE SERPL-SCNC: 107 MMOL/L (ref 94–109)
CO2 SERPL-SCNC: 28 MMOL/L (ref 20–32)
CREAT SERPL-MCNC: 0.82 MG/DL (ref 0.52–1.04)
GFR SERPL CREATININE-BSD FRML MDRD: 84 ML/MIN/{1.73_M2}
GLUCOSE SERPL-MCNC: 85 MG/DL (ref 70–99)
LIPASE SERPL-CCNC: 172 U/L (ref 73–393)
POTASSIUM SERPL-SCNC: 4.4 MMOL/L (ref 3.4–5.3)
PROT SERPL-MCNC: 6.4 G/DL (ref 6.8–8.8)
SODIUM SERPL-SCNC: 140 MMOL/L (ref 133–144)

## 2020-11-16 ENCOUNTER — HEALTH MAINTENANCE LETTER (OUTPATIENT)
Age: 45
End: 2020-11-16

## 2020-12-28 ENCOUNTER — VIRTUAL VISIT (OUTPATIENT)
Dept: FAMILY MEDICINE | Facility: OTHER | Age: 45
End: 2020-12-28

## 2020-12-28 NOTE — PROGRESS NOTES
"Date: 2020 11:45:19  Clinician: Moy Meade  Clinician NPI: [id number]  Patient: Janelle Alston  Patient : 1975  Patient Address: [address] [place], [place] [id number]  Patient Phone: [phone]  Visit Protocol: URI  Patient Summary:  Janelle is a 45 year old ( : 1975 ) female who initiated a [facility] Visit for cold, sinus infection, or influenza. When asked the question \"Please sign me up to receive news, health information and promotions. \", Janelle responded \"Yes\".    Janelle states her symptoms started gradually 10-13 days ago.   Her symptoms consist of facial pain or pressure, ear pain, a headache, tooth pain, and rhinitis.   Symptom details     Nasal secretions: The color of her mucus is yellow.    Facial pain or pressure: The facial pain or pressure feels worse when bending over or leaning forward.     Headache: She states the headache is moderate (4-6 on a 10 point pain scale).     Tooth pain: The tooth pain is not caused by a cavity, recent dental work, or other mouth problems.      Janelle denies having diarrhea, myalgias, anosmia, wheezing, fever, cough, nasal congestion, nausea, chills, malaise, sore throat, ageusia, and vomiting. She also denies double sickening (worsening symptoms after initial improvement), having a sinus infection within the past year, having recent facial or sinus surgery in the past 60 days, and taking antibiotic medication in the past month. She is not experiencing dyspnea.    Pertinent COVID-19 (Coronavirus) information  Janelle does not work or volunteer as healthcare worker or a . In the past 14 days, Janelle has not worked or volunteered at a healthcare facility or group living setting.   In the past 14 days, she also has not lived in a congregate living setting.   Janelle has not had a close contact with a laboratory-confirmed COVID-19 patient within 14 days of symptom onset.    Janelle has been tested for COVID-19.      Date(s) of her " COVID-19 test as reported by the patient (free text): 12/22/2020       Result of COVID-19 test as reported by the patient (free text): negative       Type of test as reported by the patient (free text): saliva        Pertinent medical history  Janelle has asthma. She uses quick-relief inhaler less than two times per week. She refills her quick-relief inhaler less than two times per year. She wakes up at night with asthma symptoms less than two times per month.   She has not been told by her provider to avoid NSAIDs.   Janelle does not get yeast infections when she takes antibiotics.   Janelle does not have diabetes. She denies having immunosuppressive conditions (e.g., chemotherapy, HIV, organ transplant, long-term use of steroids or other immunosuppressive medications, splenectomy). She denies having congestive heart failure and severe COPD.   Janelle does not need a return to work/school note.   Janelle does not smoke or use smokeless tobacco.   She denies pregnancy and denies breastfeeding. She does not menstruate.   Additional information as reported by the patient (free text): I haven't had a sinus infection in year, but when I do I am prescribed Z-joshua and it works and I'm not allergic to it.   Weight: 127 lbs    MEDICATIONS: loratadine (bulk), ALLERGIES: Penicillins, amoxicillin, Tetracyclines, Sulfa (Sulfonamide Antibiotics), clindamycin, ciprofloxacin, Tylenol  Clinician Response:  Dear Janelle,  Based on the information provided, you have acute bacterial sinusitis, also known as a sinus infection. Sinus infections are caused by bacteria or a virus and symptoms are almost always identical. The difference between the 2 types of infections is timing.  Sinus infections start as viral infections and symptoms improve on their own in about 7 days. If symptoms have not improved after 7 days or have even worsened, a bacterial infection may have developed.  Medication information  I am prescribing:     Azithromycin  (Zithromax Z-Alvarez) 250 mg oral tablet. Take 2 tablets by mouth on day 1, then 1 tablet by mouth on days 2-5. There are no refills with this prescription.   Yeast infections can be a common side effect of antibiotics. The most common symptom of a yeast infection is itchiness in and around the vagina. Other signs and symptoms include burning, redness, or a thick, white vaginal discharge that looks like cottage cheese and does not have a bad smell.  If you become pregnant during this course of treatment, stop taking the medication and contact your primary care provider.  Self care  Steps you can take to be as comfortable as possible:     Rest.    Drink plenty of fluids.    Take a warm shower to loosen congestion    Use a cool-mist humidifier.     When to seek care  Please be seen in a clinic or urgent care if any of the following occur:     New symptoms develop, or symptoms become worse    Symptoms do not start to improve after 3 days of treatment     Call ahead before going to the clinic or urgent care.  It is possible to have an allergic reaction to an antibiotic even if you have not had one in the past. If you notice a new rash, significant swelling, or difficulty breathing, stop taking this medication immediately and go to a clinic or urgent care.  For the latest updates on COVID-19 (Coronavirus), please visit the Centers for Disease Control and Prevention (CDC).   Diagnosis: Acute bacterial sinusitis  Diagnosis ICD: J01.90  Prescription: azithromycin (Zithromax Z-Alvarez) 250 mg oral tablet 6 tablet, 5 days supply. Take 2 tablets by mouth on day 1, then 1 tablet by mouth on days 2-5. Refills: 0, Refill as needed: no, Allow substitutions: yes  Pharmacy: [facility] #[id number] - [phone] - [address] [place] [phone]

## 2021-02-07 ENCOUNTER — HEALTH MAINTENANCE LETTER (OUTPATIENT)
Age: 46
End: 2021-02-07

## 2021-04-03 ENCOUNTER — HEALTH MAINTENANCE LETTER (OUTPATIENT)
Age: 46
End: 2021-04-03

## 2021-07-29 ENCOUNTER — APPOINTMENT (OUTPATIENT)
Dept: ULTRASOUND IMAGING | Facility: CLINIC | Age: 46
End: 2021-07-29
Attending: EMERGENCY MEDICINE
Payer: COMMERCIAL

## 2021-07-29 ENCOUNTER — HOSPITAL ENCOUNTER (EMERGENCY)
Facility: CLINIC | Age: 46
Discharge: HOME | End: 2021-07-29
Attending: EMERGENCY MEDICINE | Admitting: EMERGENCY MEDICINE
Payer: COMMERCIAL

## 2021-07-29 VITALS
SYSTOLIC BLOOD PRESSURE: 100 MMHG | WEIGHT: 128 LBS | DIASTOLIC BLOOD PRESSURE: 68 MMHG | RESPIRATION RATE: 16 BRPM | OXYGEN SATURATION: 100 % | HEIGHT: 66 IN | BODY MASS INDEX: 20.57 KG/M2 | HEART RATE: 48 BPM | TEMPERATURE: 98 F

## 2021-07-29 DIAGNOSIS — R07.89 CHEST TIGHTNESS: ICD-10-CM

## 2021-07-29 LAB
ALBUMIN SERPL-MCNC: 3.6 G/DL (ref 3.4–5)
ALP SERPL-CCNC: 60 U/L (ref 40–150)
ALT SERPL W P-5'-P-CCNC: 47 U/L (ref 0–50)
ANION GAP SERPL CALCULATED.3IONS-SCNC: 1 MMOL/L (ref 3–14)
AST SERPL W P-5'-P-CCNC: 26 U/L (ref 0–45)
BASOPHILS # BLD AUTO: 0.1 10E3/UL (ref 0–0.2)
BASOPHILS NFR BLD AUTO: 1 %
BILIRUB SERPL-MCNC: 0.4 MG/DL (ref 0.2–1.3)
BUN SERPL-MCNC: 22 MG/DL (ref 7–30)
CALCIUM SERPL-MCNC: 8.8 MG/DL (ref 8.5–10.1)
CHLORIDE BLD-SCNC: 107 MMOL/L (ref 94–109)
CO2 SERPL-SCNC: 33 MMOL/L (ref 20–32)
CREAT SERPL-MCNC: 0.91 MG/DL (ref 0.52–1.04)
EOSINOPHIL # BLD AUTO: 0.2 10E3/UL (ref 0–0.7)
EOSINOPHIL NFR BLD AUTO: 4 %
ERYTHROCYTE [DISTWIDTH] IN BLOOD BY AUTOMATED COUNT: 12.5 % (ref 10–15)
GFR SERPL CREATININE-BSD FRML MDRD: 75 ML/MIN/1.73M2
GLUCOSE BLD-MCNC: 78 MG/DL (ref 70–99)
HCT VFR BLD AUTO: 39.1 % (ref 35–47)
HGB BLD-MCNC: 13.1 G/DL (ref 11.7–15.7)
IMM GRANULOCYTES # BLD: 0 10E3/UL
IMM GRANULOCYTES NFR BLD: 0 %
LIPASE SERPL-CCNC: 136 U/L (ref 73–393)
LYMPHOCYTES # BLD AUTO: 1.9 10E3/UL (ref 0.8–5.3)
LYMPHOCYTES NFR BLD AUTO: 35 %
MCH RBC QN AUTO: 30.5 PG (ref 26.5–33)
MCHC RBC AUTO-ENTMCNC: 33.5 G/DL (ref 31.5–36.5)
MCV RBC AUTO: 91 FL (ref 78–100)
MONOCYTES # BLD AUTO: 0.3 10E3/UL (ref 0–1.3)
MONOCYTES NFR BLD AUTO: 6 %
NEUTROPHILS # BLD AUTO: 2.8 10E3/UL (ref 1.6–8.3)
NEUTROPHILS NFR BLD AUTO: 54 %
NRBC # BLD AUTO: 0 10E3/UL
NRBC BLD AUTO-RTO: 0 /100
PLATELET # BLD AUTO: 237 10E3/UL (ref 150–450)
POTASSIUM BLD-SCNC: 4.2 MMOL/L (ref 3.4–5.3)
PROT SERPL-MCNC: 6.8 G/DL (ref 6.8–8.8)
RBC # BLD AUTO: 4.29 10E6/UL (ref 3.8–5.2)
SODIUM SERPL-SCNC: 141 MMOL/L (ref 133–144)
TROPONIN I SERPL-MCNC: <0.015 UG/L (ref 0–0.04)
WBC # BLD AUTO: 5.3 10E3/UL (ref 4–11)

## 2021-07-29 PROCEDURE — 82040 ASSAY OF SERUM ALBUMIN: CPT | Performed by: EMERGENCY MEDICINE

## 2021-07-29 PROCEDURE — 76705 ECHO EXAM OF ABDOMEN: CPT

## 2021-07-29 PROCEDURE — 84484 ASSAY OF TROPONIN QUANT: CPT | Performed by: EMERGENCY MEDICINE

## 2021-07-29 PROCEDURE — 36415 COLL VENOUS BLD VENIPUNCTURE: CPT | Performed by: EMERGENCY MEDICINE

## 2021-07-29 PROCEDURE — 85025 COMPLETE CBC W/AUTO DIFF WBC: CPT | Performed by: EMERGENCY MEDICINE

## 2021-07-29 PROCEDURE — 99284 EMERGENCY DEPT VISIT MOD MDM: CPT | Mod: 25

## 2021-07-29 PROCEDURE — 83690 ASSAY OF LIPASE: CPT | Performed by: EMERGENCY MEDICINE

## 2021-07-29 ASSESSMENT — ENCOUNTER SYMPTOMS
NAUSEA: 0
UNEXPECTED WEIGHT CHANGE: 0
HEADACHES: 1
VOMITING: 0
ABDOMINAL PAIN: 0
BLOOD IN STOOL: 0
CHEST TIGHTNESS: 1

## 2021-07-29 ASSESSMENT — MIFFLIN-ST. JEOR: SCORE: 1227.35

## 2021-07-29 NOTE — ED PROVIDER NOTES
"  History     Chief Complaint:  Chest Pain    HPI   Janelle Alston is a 46 year old female who presents with chest tightness and abdominal pain. The patient reports that 4 days ago she started to develop a mild headache that she thought was related to tension and then 2-3 days ago, chest tightness, which she also thought was stress and anxiety related. She also notes for the last 6 months she has been experiencing intermittent tingling to her toes. Today, she was seen by her chiropractor for a hip related issue, when they reportedly pressure on her stomach and were concerned for tenderness, as well as her \"belly button pulled to one side\", and they wanted her to be evaluated with an bulge given their findings today and her symptoms recently. The patient states that she feels overall well and did not have concerns regarding her symptoms herself. She denies worsening symptoms with exercise, nausea, vomiting, fevers, weight changes, stool changes, or other symptoms. No history of diabetes or hypertension.     Review of Systems   Constitutional: Negative for unexpected weight change.   Respiratory: Positive for chest tightness.    Gastrointestinal: Negative for abdominal pain (subjective), blood in stool, nausea and vomiting.   Neurological: Positive for headaches.   All other systems reviewed and are negative.      Allergies:  Amoxicillin  Ciprofloxacin  Clindamycin  Erythromycin  Penicillins  Sulfa Drugs  Tetracycline  Tylenol    Medications:    albuterol  Epipen     Past Medical History:     Allergic rhinitis  Pelvic floor dysfunction   Menorrhagia  Hepatic adenoma  Asthma     Past Surgical History:    Abdominoplasty  C section  Dilation and curettage, hysteroscopy, ablate endometrium Novasure   Tooth extractions   laparoscopy abdomen     Family History:    Maternal grandmother: cancer    Social History:  Patient presents alone.  Patient follows a chiropractor.     Physical Exam     Patient Vitals for the past 24 " "hrs:   BP Temp Temp src Pulse Resp SpO2 Height Weight   07/29/21 1630 100/68 -- -- (!) 48 16 100 % -- --   07/29/21 1625 -- -- -- 51 -- 99 % -- --   07/29/21 1620 106/69 -- -- -- -- -- -- --   07/29/21 1235 112/73 98  F (36.7  C) Oral 50 16 100 % 1.676 m (5' 6\") 58.1 kg (128 lb)     Physical Exam  Vitals: reviewed by me  General: [facility] cooperative, and alert to conversation  Eyes: Tracking well, clear conjunctiva BL  ENT: MMM, midline trachea.   Lungs: No tachypnea, no accessory muscle use. No respiratory distress.   CV: Rate as above  Abd: Soft, non tender, no guarding, no rebound. Non distended  MSK: no joint effusion.  No evidence of trauma  Skin: No rash  Neuro: Clear speech and no facial droop.  Psych: Not RIS, no e/o AH/VH      Emergency Department Course     Imaging:    US Abdomen Limited  Normal right upper quadrant abdominal ultrasound.  DAPHNE CORDOBA MD   Reading per radiology    Laboratory:    CBC: WBC 5.03, HGB 13.1,   CMP: Carbon Dioxide 33 (H), Anion Gap 1 (L), o/w WNL (Creatinine 0.91)    Lipase: 136    Troponin (Collected 1511): <0.015    Emergency Department Course:    Reviewed:  I reviewed nursing notes, vitals, past history and care everywhere    Assessments:  1458 I obtained history and examined the patient as noted above.     Disposition:  The patient was discharged to home.    Impression & Plan        Medical Decision Making:  Janelle Alston is a very pleasant 46 year old female who presents to the ED at the request of her chiropractor for a possible abdominal mass. I do not feel any mass, and her labs are thankfully reassuring. Out of an abundance of caution, I did do an ultrasound, and her aortic looks normal on the ultrasound. I do think that she is stable for outpatient management, regarding the vague chest tightness she had, the troponin is negative, and she associates this with stress having it for several days. I do think that a normal EKG or " troponin is a enough x1 to rule out ACS at this time. Will plan for discharge to home with very clear return to ED precautions and primary care follow up for her ongoing issues.      Diagnosis:    ICD-10-CM    1. Chest tightness  R07.89      Discharge Medications:  Discharge Medication List as of 7/29/2021  4:40 PM        Scribe Disclosure:  I, JASPALEDYAO, am serving as a scribe, under training supervision of Orla Severson, at 2:58 PM on 7/29/2021 to document services personally performed by Lee Spivey MD based on my observations and the provider's statements to me.     I, Orla Severson, am serving as a  at 4:34 PM on 7/29/2021 to document services personally performed by Lee Spivey MD based on my observations and the provider's statements to me.          Lee Spivey MD  07/29/21 1733

## 2021-07-29 NOTE — DISCHARGE INSTRUCTIONS
As we discussed, no problem was found with your aorta today, and your ultrasound was normal.  Please come back to the ER immediately with any worsening symptoms, be sure to follow with your regular doctor in the next 1 week if you do not feel improved.  Come back to the ER immediately with any other concerns.

## 2021-07-29 NOTE — ED TRIAGE NOTES
"Patient comes in for evaluation of mild chest pain for 2 days. Patient was at chiropractor for hip pain and he pushed on her epigastrium which was tender, sent her over with concern for an \"aortic bulge.\" ABCs intact.  "

## 2021-07-30 LAB
ATRIAL RATE - MUSE: 49 BPM
DIASTOLIC BLOOD PRESSURE - MUSE: NORMAL MMHG
INTERPRETATION ECG - MUSE: NORMAL
P AXIS - MUSE: 79 DEGREES
PR INTERVAL - MUSE: 162 MS
QRS DURATION - MUSE: 80 MS
QT - MUSE: 466 MS
QTC - MUSE: 420 MS
R AXIS - MUSE: 65 DEGREES
SYSTOLIC BLOOD PRESSURE - MUSE: NORMAL MMHG
T AXIS - MUSE: 61 DEGREES
VENTRICULAR RATE- MUSE: 49 BPM

## 2021-08-23 ENCOUNTER — OFFICE VISIT (OUTPATIENT)
Dept: OBGYN | Facility: CLINIC | Age: 46
End: 2021-08-23
Payer: COMMERCIAL

## 2021-08-23 ENCOUNTER — THERAPY VISIT (OUTPATIENT)
Dept: PHYSICAL THERAPY | Facility: CLINIC | Age: 46
End: 2021-08-23
Payer: COMMERCIAL

## 2021-08-23 VITALS — DIASTOLIC BLOOD PRESSURE: 60 MMHG | SYSTOLIC BLOOD PRESSURE: 104 MMHG

## 2021-08-23 DIAGNOSIS — Z01.411 ENCOUNTER FOR GYNECOLOGICAL EXAMINATION WITH ABNORMAL FINDING: ICD-10-CM

## 2021-08-23 DIAGNOSIS — R53.83 OTHER FATIGUE: Primary | ICD-10-CM

## 2021-08-23 DIAGNOSIS — E04.9 ENLARGED THYROID: ICD-10-CM

## 2021-08-23 DIAGNOSIS — R10.2 PELVIC PAIN IN FEMALE: Primary | ICD-10-CM

## 2021-08-23 DIAGNOSIS — Z13.6 CARDIOVASCULAR SCREENING; LDL GOAL LESS THAN 100: ICD-10-CM

## 2021-08-23 DIAGNOSIS — Z12.31 ENCOUNTER FOR SCREENING MAMMOGRAM FOR BREAST CANCER: ICD-10-CM

## 2021-08-23 DIAGNOSIS — Z12.4 SCREENING FOR MALIGNANT NEOPLASM OF CERVIX: ICD-10-CM

## 2021-08-23 LAB — T3 SERPL-MCNC: 62 NG/DL (ref 60–181)

## 2021-08-23 PROCEDURE — 99213 OFFICE O/P EST LOW 20 MIN: CPT | Mod: 25 | Performed by: OBSTETRICS & GYNECOLOGY

## 2021-08-23 PROCEDURE — 97535 SELF CARE MNGMENT TRAINING: CPT | Mod: GP | Performed by: PHYSICAL THERAPIST

## 2021-08-23 PROCEDURE — 84443 ASSAY THYROID STIM HORMONE: CPT | Performed by: OBSTETRICS & GYNECOLOGY

## 2021-08-23 PROCEDURE — 86800 THYROGLOBULIN ANTIBODY: CPT | Performed by: OBSTETRICS & GYNECOLOGY

## 2021-08-23 PROCEDURE — 99386 PREV VISIT NEW AGE 40-64: CPT | Performed by: OBSTETRICS & GYNECOLOGY

## 2021-08-23 PROCEDURE — 84480 ASSAY TRIIODOTHYRONINE (T3): CPT | Performed by: OBSTETRICS & GYNECOLOGY

## 2021-08-23 PROCEDURE — 87624 HPV HI-RISK TYP POOLED RSLT: CPT | Performed by: OBSTETRICS & GYNECOLOGY

## 2021-08-23 PROCEDURE — 97140 MANUAL THERAPY 1/> REGIONS: CPT | Mod: GP | Performed by: PHYSICAL THERAPIST

## 2021-08-23 PROCEDURE — G0145 SCR C/V CYTO,THINLAYER,RESCR: HCPCS | Performed by: OBSTETRICS & GYNECOLOGY

## 2021-08-23 PROCEDURE — 36415 COLL VENOUS BLD VENIPUNCTURE: CPT | Performed by: OBSTETRICS & GYNECOLOGY

## 2021-08-23 PROCEDURE — 84439 ASSAY OF FREE THYROXINE: CPT | Performed by: OBSTETRICS & GYNECOLOGY

## 2021-08-23 PROCEDURE — 86376 MICROSOMAL ANTIBODY EACH: CPT | Performed by: OBSTETRICS & GYNECOLOGY

## 2021-08-23 PROCEDURE — 97161 PT EVAL LOW COMPLEX 20 MIN: CPT | Mod: GP | Performed by: PHYSICAL THERAPIST

## 2021-08-23 NOTE — PATIENT INSTRUCTIONS
You can reach your [facility] Care Team any time of the day by calling [phone]. This number will put you in touch with the 24 hour nurse line if the clinic is closed.    To contact your OB/GYN Station Coordinator/Surgery Scheduler please call [phone]. This is a direct number for your care team between 8 a.m. and 4 p.m. Monday through Friday.    [facility] is open for your convenience:  Monday through Friday 8 a.m. to 6 p.m.  Closed weekends and all major holidays.

## 2021-08-23 NOTE — PROGRESS NOTES
"HPI:  Janelle Alston is a 46 year old white female  ,vasectomy for contraception who presents for an annual exam and pap.  She is experiencing some fatigue.  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  Patient states that her previous physician suggested that there was a mildly enlarged thyroid.  She denies temperature intolerance hair loss changes in skin turgor or other clinical symptoms.  The patient also thinks that she may have a \"cyst on her cervix \"and would like to have them looked at today.  It is asymptomatic.  I reviewed her previous lipid panel and mammogram screening results.  Self breast exam,  ACS screening mammogram recs, the use of 81 mg ASA to decrease the risk of heart disease, lipid screening, colon cancer screening recs and Dexa scan recs thoroughly reveiwed.      Past Medical History:   Diagnosis Date     Abdominal pain      Allergic rhinitis, cause unspecified      Pelvic floor dysfunction 2018    Seeing pelvic floor physical therapist      Past Surgical History:   Procedure Laterality Date     ABDOMINOPLASTY       C  DELIVERY ONLY       and      DILATION AND CURETTAGE, HYSTEROSCOPY, ABLATE ENDOMETRIUM NOVASURE, COMBINED N/A 2016    Procedure: COMBINED DILATION AND CURETTAGE, HYSTEROSCOPY, ABLATE ENDOMETRIUM NOVASURE;  Surgeon: Isak Eaton MD;  Location: [facility] OR     HC TOOTH EXTRACTION W/FORCEP       SURGICAL HISTORY OF -       laproscopy -abd-for endometriosis     Family History   Problem Relation Age of Onset     Family History Negative Mother      Family History Negative Father      Diabetes Other         paternal greatgrandmother     Family History Negative Brother         1     Cancer Maternal Grandmother         ? kidney cancer     Social History     Socioeconomic History     Marital status:      Spouse name: Not on file     Number of children: Not on file     Years " of education: Not on file     Highest education level: Not on file   Occupational History     Occupation: aerobic instructor     Employer: [employer]   Tobacco Use     Smoking status: Never Smoker     Smokeless tobacco: Never Used   Substance and Sexual Activity     Alcohol use: No     Drug use: No     Sexual activity: Yes     Partners: Male     Birth control/protection: Condom, Surgical     Comment:  had vasectomy   Other Topics Concern     Parent/sibling w/ CABG, MI or angioplasty before 65F 55M? No   Social History Narrative     Not on file     Social Determinants of Health     Financial Resource Strain:      Difficulty of Paying Living Expenses:    Food Insecurity:      Worried About Running Out of Food in the Last Year:      Ran Out of Food in the Last Year:    Transportation Needs:      Lack of Transportation (Medical):      Lack of Transportation (Non-Medical):    Physical Activity:      Days of Exercise per Week:      Minutes of Exercise per Session:    Stress:      Feeling of Stress :    Social Connections:      Frequency of Communication with Friends and Family:      Frequency of Social Gatherings with Friends and Family:      Attends [employer] Services:      Active Member of Clubs or Organizations:      Attends Club or Organization Meetings:      Marital Status:    Intimate Partner Violence:      Fear of Current or Ex-Partner:      Emotionally Abused:      Physically Abused:      Sexually Abused:        Allergies:  Amoxicillin, Ciproflox [ciprofloxacin], Clindamycin, Erythromycin, Pcn [penicillins], Sulfa drugs, Tetracycline, and Tylenol [acetaminophen]    Current Outpatient Medications   Medication Sig Dispense Refill     ACIDOPHILUS OR CAPS 2 tabs per day of an UNK dose       albuterol (PROAIR HFA/PROVENTIL HFA/VENTOLIN HFA) 108 (90 Base) MCG/ACT inhaler Inhale 1-2 puffs into the lungs every 4 hours as needed (Cough, Wheezing, or Shortness of Breath) 2 Inhaler 1     B Complex Vitamins SOLN         EPINEPHrine (EPIPEN 2-ANNEMARIE) 0.3 MG/0.3ML injection Inject 0.3 mLs (0.3 mg) into the muscle once as needed for anaphylaxis 0.6 mL 1     fexofenadine (ALLEGRA) 180 MG tablet Take 1 tablet (180 mg) by mouth daily       Flaxseed, Linseed, (FLAXSEED OIL) 1000 MG CAPS 1 tsp daily       fluticasone (FLONASE) 50 MCG/ACT nasal spray Spray 1-2 sprays into both nostrils daily 16 g 11     Magnesium 400 MG CAPS Take 2 tablets by mouth.       OMEGA 3 1000 MG PO CAPS 3 CAPSULES DAILY WITH A MEAL       VITAMIN C 100 MG PO CHEW 1 TABLET 3 TIMES DAILY       VITAMIN D 1000 UNIT PO TABS 1 TABLET DAILY or PRN         ROS: ROS: 10 point ROS neg other than the symptoms noted above in the HPI.    EXAM:  Vitals: /60   BMI= There is no height or weight on file to calculate BMI.  Constitutional: healthy, alert and no distress  Head: Normocephalic. No masses, lesions, tenderness or abnormalities  Neck: Neck supple. No adenopathy. Thyroid symmetric, slightly prominent in size, no nodularity noted, Carotids without bruits.  ENT: NEGATIVE for ear, mouth and throat problems  Breast:  breasts symmetric, no dominant or suspicious mass, no skin or nipple changes, no axillary adenopathy, unchanged from previous exam or self exam in taught and encouraged  Cardiovascular: negative, PMI normal. No lifts, heaves, or thrills. RRR. No murmurs, clicks gallops or rub  Respiratory: negative, Percussion normal. Good diaphragmatic excursion. Lungs clear  Gastrointestinal: Abdomen soft, non-tender. BS normal. No masses, organomegaly  Genitourinary: Pelvic Exam:  External Genitalia:     Normal appearance for age, no discharge present, no tenderness present, no inflammatory lesions present, color normal  Vagina:     Normal vaginal vault without central or paravaginal defects, no discharge present, no inflammatory lesions present, no masses present  Bladder:     Nontender to palpation  Urethra:   Urethral Body:  Urethra palpation normal, urethra  structural support normal   Urethral Meatus:  No erythema or lesions present  Cervix:     Appearance healthy, no lesions present, nontender to palpation, no bleeding present there is a pea-sized nodule consistent with a fibroid in the anterior lower uterine segment that is nontender  Uterus:     Uterus: firm, normal sized and nontender, retroverted in position.   Adnexa:     No adnexal tenderness present, no adnexal masses present  Perineum:     Perineum within normal limits, no evidence of trauma, no rashes or skin lesions present  Anus:     Anus within normal limits, no hemorrhoids present  Inguinal Lymph Nodes:     No lymphadenopathy present  Pubic Hair:     Normal pubic hair distribution for age  Genitalia and Groin:     No rashes present, no lesions present, no areas of discoloration, no masses present    Musculoskeletalextremities normal- no gross deformities noted, gait normal and normal muscle tone  Integument: no suspicious lesions or rashes  Neurologic: Gait normal. Reflexes normal and symmetric. Sensation grossly WNL.  Psychiatric: mentation appears normal and affect normal/bright  Hematologic/Lymphatic/Immunologic: Normal cervical lymph nodes     ASSESSMENT:/PLAN:  (R53.83) Other fatigue  (primary encounter diagnosis)  Comment: I reviewed the pathophysiology of fatigue at length and the question of marginally enlarged thyroid gland  Plan: ANTI THYROGLOBULIN ANTIBODY, T3 TOTAL, TSH,         THYROID PEROXIDASE ANTIBODY, T4, free        We will check the following blood test at the patient's request.  We will also get an ultrasound of the thyroid for further evaluation with appropriate therapy to follow    (Z01.411) Encounter for gynecological examination with abnormal finding  Comment: Small fibroid in the lower uterine segment that is otherwise asymptomatic  Plan: Findings reviewed with the patient she understands and accepts.  The patient states that this is been there in the past is unchanged in  size    (E04.9) Enlarged thyroid  Comment: Findings reviewed with the patient  Plan: US Thyroid        Get an ultrasound for completeness    (Z13.6) CARDIOVASCULAR SCREENING; LDL GOAL LESS THAN 100  Comment: Past history and recommendations reviewed  Plan: Lipid panel reflex to direct LDL Fasting        Ordered    (Z12.31) Encounter for screening mammogram for breast cancer  Comment: Past exam and recommendations reviewed  Plan: MA Screen Bilateral w/Evan        Ordered    (Z12.4) Screening for malignant neoplasm of cervix  Comment: Past history and recommendations reviewed  Plan: Pap screen with HPV - recommended age 30 - 65         years        Done today.  We will follow her back to review her lab results with appropriate therapy to follow otherwise return in 1 year for routine health care maintenance or as needed concerns Arise      Jay Lovelace M.D.

## 2021-08-23 NOTE — NURSING NOTE
"Chief Complaint   Patient presents with     Physical     initial /60  Estimated body mass index is 20.66 kg/m  as calculated from the following:    Height as of 7/29/21: 1.676 m (5' 6\").    Weight as of 7/29/21: 58.1 kg (128 lb).  BP completed using cuff size regular.  Darcy Atwood CMA    "

## 2021-08-23 NOTE — PROGRESS NOTES
"Physical Therapy Initial Evaluation  Subjective:  Last seen in PT on 2019. Flare-up of rectal/vaginal pain/LBP after doing more walking in the past few months (longer and slower). 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 Much better past 2 weeks with chiro and [facility]. Some rectal/vaginal pain with lots of driving/sitting. Denies any bladder or bowel symptoms.     The history is provided by the patient.   Patient Health History         Pain is reported as 2/10 on pain scale.  General health as reported by patient is excellent.  Pertinent medical history includes: none.     Medical allergies: see EPIC.   Surgeries include:  Other. Other surgery history details: C-sections.    Current medications:  None.    Current occupation is [employer]                                       Objective:  Standing Alignment:        Lumbar:  Lordosis decr                           Lumbar/SI Evaluation  ROM:    AROM Lumbar:   Flexion:          Guarded with flexion but WNL  Ext:                    Mod loss   Side Bend:        Left:  Min loss    Right:  Min loss  Rotation:           Left:     Right:   Side Glide:        Left:     Right:           Lumbar Myotomes:  not assessed                  Neural Tension/Mobility:  Lumbar:  Normal                                        Pelvic Dysfunction Evaluation:        Flexibility:    Tightness present at:Adductors and Iliopsoas    Abdominal Wall:  normal        Pelvic Clock Exam:  Pelvic clock exam: L>R OI/LA tightness.  Ischiocavernosis pain:  -  Bulbocavernosis pain:  -  Transverse Perineal:  -  Levator ANI:  ++      Reflex Testing:  normal    External Assessment:  normal              Internal Assessment:  Internal assessment pelvic: 4 kegel, delayed relaxation of ~ 5-6 seconds to baseline.     Contraction/Grade:  Good squeeze, good hold with lift, repeatable (4)          Additional History:  Delivery History:    Number of Pregnancies: " 2  Number of Live Births: 2                       General     ROS    Assessment/Plan:    Patient is a 46 year old female with pelvic complaints.    Patient has the following significant findings with corresponding treatment plan.                Diagnosis 1:  Lumbar/pelvic pain  Pain -  hot/cold therapy, manual therapy, splint/taping/bracing/orthotics, self management, education, directional preference exercise and home program  Decreased ROM/flexibility - manual therapy and therapeutic exercise  Decreased strength - therapeutic exercise and therapeutic activities  Inflammation - self management/home program  Impaired muscle performance - neuro re-education  Decreased function - therapeutic activities  Impaired posture - neuro re-education    Therapy Evaluation Codes:   1) History comprised of:   Personal factors that impact the plan of care:      None.    Comorbidity factors that impact the plan of care are:      None.     Medications impacting care: None.  2) Examination of Body Systems comprised of:   Body structures and functions that impact the plan of care:      Lumbar spine and Pelvis.   Activity limitations that impact the plan of care are:      Sitting and Walking.  3) Clinical presentation characteristics are:   Stable/Uncomplicated.  4) Decision-Making    Low complexity using standardized patient assessment instrument and/or measureable assessment of functional outcome.  Cumulative Therapy Evaluation is: Low complexity.    Previous and current functional limitations:  (See Goal Flow Sheet for this information)    Short term and Long term goals: (See Goal Flow Sheet for this information)     Communication ability:  Patient appears to be able to clearly communicate and understand verbal and written communication and follow directions correctly.  Treatment Explanation - The following has been discussed with the patient:   RX ordered/plan of care  Anticipated outcomes  Possible risks and side effects  This  patient would benefit from PT intervention to resume normal activities.   Rehab potential is excellent.    Frequency:  2 X a month, once daily  Duration:  for 3 months  Discharge Plan:  Achieve all LTG.  Independent in home treatment program.  Reach maximal therapeutic benefit.    Please refer to the daily flowsheet for treatment today, total treatment time and time spent performing 1:1 timed codes.

## 2021-08-24 LAB
T4 FREE SERPL-MCNC: 0.98 NG/DL (ref 0.76–1.46)
THYROGLOB AB SERPL IA-ACNC: <20 IU/ML
THYROPEROXIDASE AB SERPL-ACNC: <10 IU/ML
TSH SERPL DL<=0.005 MIU/L-ACNC: 1.31 MU/L (ref 0.4–4)

## 2021-08-25 LAB
BKR LAB AP GYN ADEQUACY: NORMAL
BKR LAB AP GYN INTERPRETATION: NORMAL
BKR LAB AP HPV REFLEX: NORMAL
BKR LAB AP PREVIOUS ABNORMAL: NORMAL
PATH REPORT.COMMENTS IMP SPEC: NORMAL
PATH REPORT.RELEVANT HX SPEC: NORMAL

## 2021-08-27 LAB
HUMAN PAPILLOMA VIRUS 16 DNA: NEGATIVE
HUMAN PAPILLOMA VIRUS 18 DNA: NEGATIVE
HUMAN PAPILLOMA VIRUS FINAL DIAGNOSIS: NORMAL
HUMAN PAPILLOMA VIRUS OTHER HR: NEGATIVE

## 2021-08-31 ENCOUNTER — HOSPITAL ENCOUNTER (OUTPATIENT)
Dept: MAMMOGRAPHY | Facility: CLINIC | Age: 46
Discharge: HOME | End: 2021-08-31
Attending: OBSTETRICS & GYNECOLOGY | Admitting: OBSTETRICS & GYNECOLOGY
Payer: COMMERCIAL

## 2021-08-31 ENCOUNTER — HOSPITAL ENCOUNTER (OUTPATIENT)
Dept: ULTRASOUND IMAGING | Facility: CLINIC | Age: 46
Discharge: HOME | End: 2021-08-31
Attending: OBSTETRICS & GYNECOLOGY | Admitting: OBSTETRICS & GYNECOLOGY
Payer: COMMERCIAL

## 2021-08-31 DIAGNOSIS — Z12.31 ENCOUNTER FOR SCREENING MAMMOGRAM FOR BREAST CANCER: ICD-10-CM

## 2021-08-31 DIAGNOSIS — E04.9 ENLARGED THYROID: ICD-10-CM

## 2021-08-31 PROCEDURE — 76536 US EXAM OF HEAD AND NECK: CPT

## 2021-08-31 PROCEDURE — 77067 SCR MAMMO BI INCL CAD: CPT

## 2021-09-04 ENCOUNTER — TELEPHONE (OUTPATIENT)
Dept: OBGYN | Facility: CLINIC | Age: 46
End: 2021-09-04

## 2021-09-04 DIAGNOSIS — E04.1 THYROID NODULE: Primary | ICD-10-CM

## 2021-09-04 NOTE — TELEPHONE ENCOUNTER
I reviewed the results of her ultrasound of her thyroid showing multilobular gland.  In light of her symptoms I like to have her see endocrinology for evaluation.  Can you help her arrange this appointment  Thank you  Jay Lovelace MD FACOG

## 2021-09-07 ENCOUNTER — HOSPITAL ENCOUNTER (OUTPATIENT)
Dept: MAMMOGRAPHY | Facility: CLINIC | Age: 46
End: 2021-09-07
Attending: OBSTETRICS & GYNECOLOGY
Payer: COMMERCIAL

## 2021-09-07 DIAGNOSIS — R92.8 ABNORMAL MAMMOGRAM: ICD-10-CM

## 2021-09-07 PROCEDURE — 77061 BREAST TOMOSYNTHESIS UNI: CPT | Mod: LT

## 2021-09-07 NOTE — LETTER
Janelle Alston  [id number] Replaced by [facility] [id number]            September 7, 2021  Date of Exam:     Dear Janelle:    Thank you for your recent visit.    Breast Imaging Result: Based on your recent breast imaging, you have a suspicious area that usually requires a biopsy, at which time a small tissue sample would be taken from your breast.      Breast Density: Your breast imaging shows that you have dense breast tissue. This means you have a slightly higher risk of getting breast cancer. It also means your breast imaging will be harder to read, but it doesn't mean that breast imaging isn't useful. In fact, yearly breast imaging is even more important for individuals at higher risk. Additional testing may be warranted depending on your overall risk.    If you have already made these arrangements, please disregard this letter.    A report of your breast imaging results was sent to: Jay Lovelace    Your breast imaging will become part of your medical file here at [facility] for at least 10 years. You are responsible for informing any new health care team or breast imaging facility of the date and location of this examination.    We appreciate the opportunity to participate in your health care.    Sincerely,  Trevor Veloz MD  [facility]

## 2021-09-17 ENCOUNTER — HOSPITAL ENCOUNTER (OUTPATIENT)
Dept: MAMMOGRAPHY | Facility: CLINIC | Age: 46
End: 2021-09-17
Attending: OBSTETRICS & GYNECOLOGY
Payer: COMMERCIAL

## 2021-09-17 DIAGNOSIS — R92.8 ABNORMAL MAMMOGRAM: ICD-10-CM

## 2021-09-17 PROCEDURE — 272N000715 MA STEREOTACTIC BREAST BIOPSY VACUUM LT

## 2021-09-17 PROCEDURE — 88305 TISSUE EXAM BY PATHOLOGIST: CPT | Mod: TC | Performed by: OBSTETRICS & GYNECOLOGY

## 2021-09-17 PROCEDURE — 250N000009 HC RX 250: Performed by: OBSTETRICS & GYNECOLOGY

## 2021-09-17 RX ORDER — LIDOCAINE HYDROCHLORIDE 10 MG/ML
10 INJECTION, SOLUTION EPIDURAL; INFILTRATION; INTRACAUDAL; PERINEURAL ONCE
Status: COMPLETED | OUTPATIENT
Start: 2021-09-17 | End: 2021-09-17

## 2021-09-17 RX ORDER — LIDOCAINE HYDROCHLORIDE AND EPINEPHRINE 10; 10 MG/ML; UG/ML
1 INJECTION, SOLUTION INFILTRATION; PERINEURAL ONCE
Status: COMPLETED | OUTPATIENT
Start: 2021-09-17 | End: 2021-09-17

## 2021-09-17 RX ADMIN — LIDOCAINE HYDROCHLORIDE 6 ML: 10 INJECTION, SOLUTION EPIDURAL; INFILTRATION; INTRACAUDAL; PERINEURAL at 13:46

## 2021-09-17 RX ADMIN — LIDOCAINE HYDROCHLORIDE AND EPINEPHRINE 12 ML: 10; 10 INJECTION, SOLUTION INFILTRATION; PERINEURAL at 13:48

## 2021-09-17 NOTE — DISCHARGE INSTRUCTIONS

## 2021-09-18 ENCOUNTER — HEALTH MAINTENANCE LETTER (OUTPATIENT)
Age: 46
End: 2021-09-18

## 2021-09-21 LAB
PATH REPORT.COMMENTS IMP SPEC: NORMAL
PATH REPORT.FINAL DX SPEC: NORMAL
PATH REPORT.GROSS SPEC: NORMAL
PATH REPORT.MICROSCOPIC SPEC OTHER STN: NORMAL
PATH REPORT.RELEVANT HX SPEC: NORMAL
PHOTO IMAGE: NORMAL

## 2021-09-21 PROCEDURE — 88305 TISSUE EXAM BY PATHOLOGIST: CPT | Mod: 26 | Performed by: PATHOLOGY

## 2021-09-22 ENCOUNTER — TELEPHONE (OUTPATIENT)
Dept: MAMMOGRAPHY | Facility: CLINIC | Age: 46
End: 2021-09-22

## 2021-09-22 NOTE — TELEPHONE ENCOUNTER
Pathology report reviewed with our breast radiologist Dr Soriano, who confirmed the recent breast imaging is concordant with the final pathology results below.    I phoned Ms Alston, confirmed her full name, date of birth, and informed patient of her stereotactic Guided left Breast Needle Biopsy (09/17/21) results showing benign Fibrocystic change and benign breast tissue with associated calcifications.  -Negative for atypia or malignancy.     Recommended follow up is routine screening mammogram.   Patient states no problems or concerns with her biopsy site.   Questions were answered and my phone number given if she has further questions or concerns.  I informed patient I will notify the ordering provider of the results and recommendations for follow up.  Patient verbalized understanding and agrees with the plan of care.     Mela Garcia RN CBCN  Breast Care Nurse Coordinator  [facility]  [phone]

## 2021-10-12 ENCOUNTER — THERAPY VISIT (OUTPATIENT)
Dept: PHYSICAL THERAPY | Facility: CLINIC | Age: 46
End: 2021-10-12
Payer: COMMERCIAL

## 2021-10-12 DIAGNOSIS — R10.2 PELVIC PAIN IN FEMALE: ICD-10-CM

## 2021-10-12 PROCEDURE — 97535 SELF CARE MNGMENT TRAINING: CPT | Mod: GP | Performed by: PHYSICAL THERAPIST

## 2021-10-12 PROCEDURE — 97140 MANUAL THERAPY 1/> REGIONS: CPT | Mod: GP | Performed by: PHYSICAL THERAPIST

## 2021-11-23 ENCOUNTER — TELEPHONE (OUTPATIENT)
Dept: URGENT CARE | Facility: URGENT CARE | Age: 46
End: 2021-11-23

## 2021-11-23 ENCOUNTER — E-VISIT (OUTPATIENT)
Dept: URGENT CARE | Facility: URGENT CARE | Age: 46
End: 2021-11-23
Payer: COMMERCIAL

## 2021-11-23 DIAGNOSIS — R09.81 NASAL CONGESTION: ICD-10-CM

## 2021-11-23 DIAGNOSIS — J06.9 VIRAL URI: ICD-10-CM

## 2021-11-23 DIAGNOSIS — R05.8 PRODUCTIVE COUGH: Primary | ICD-10-CM

## 2021-11-23 PROCEDURE — 99421 OL DIG E/M SVC 5-10 MIN: CPT | Performed by: FAMILY MEDICINE

## 2021-11-23 RX ORDER — BENZONATATE 100 MG/1
100 CAPSULE ORAL 3 TIMES DAILY PRN
Qty: 30 CAPSULE | Refills: 0 | Status: SHIPPED | OUTPATIENT
Start: 2021-11-23 | End: 2022-07-05

## 2021-11-23 NOTE — PATIENT INSTRUCTIONS
You may want to try warm salt water gargles or rinses to feel better or help prevent another bout in the future. Mix 1 teaspoon of salt in 8 ounces of water, gargle, and spit. Do this several times a day for several days. Do not swallow the mixture.    You may want to try a nasal lavage (also known as nasal irrigation). You can find over-the-counter products, such as Neti-Pot, at retail locations or make your own at home. Instructions for homemade nasal lavage and more information on the process are available online at http://www.aafp.org/afp/2009/1115/p1121.html.      Viral Upper Respiratory Illness (Adult)    You have a viral upper respiratory illness (URI), which is another term for the common cold. This illness is contagious during the first few days. It is spread through the air by coughing and sneezing. It may also be spread by direct contact (touching the sick person and then touching your own eyes, nose, or mouth). Frequent handwashing will decrease risk of spread. Most viral illnesses go away within 7 to 10 days with rest and simple home remedies. Sometimes the illness may last for several weeks. Antibiotics will not kill a virus, and they are generally not prescribed for this condition.  Home care    If symptoms are severe, rest at home for the first 2 to 3 days. When you resume activity, don't let yourself get too tired.    Don't smoke. If you need help stopping, talk with your healthcare provider.    Avoid being exposed to cigarette smoke (yours or others ).    You may use acetaminophen or ibuprofen to control pain and fever, unless another medicine was prescribed. If you have chronic liver or kidney disease, have ever had a stomach ulcer or gastrointestinal bleeding, or are taking blood-thinning medicines, talk with your healthcare provider before using these medicines. Aspirin should never be given to anyone under 18 years of age who is ill with a viral infection or fever. It may cause severe liver  or brain damage.    Your appetite may be poor, so a light diet is fine. Stay well hydrated by drinking 6 to 8 glasses of fluids per day (water, soft drinks, juices, tea, or soup). Extra fluids will help loosen secretions in the nose and lungs.    Over-the-counter cold medicines will not shorten the length of time you re sick, but they may be helpful for the following symptoms: cough, sore throat, and nasal and sinus congestion. If you take prescription medicines, ask your healthcare provider or pharmacist which over-the-counter medicines are safe to use. (Note: Don't use decongestants if you have high blood pressure.)  Follow-up care  Follow up with your healthcare provider, or as advised.  When to seek medical advice  Call your healthcare provider right away if any of these occur:    Cough with lots of colored sputum (mucus)    Severe headache; face, neck, or ear pain    Difficulty swallowing due to throat pain    Fever of 100.4 F (38 C) or higher, or as directed by your healthcare provider  Call 911  Call 911 if any of these occur:    Chest pain, shortness of breath, wheezing, or difficulty breathing    Coughing up blood    Very severe pain with swallowing, especially if it goes along with a muffled voice   [employer] last reviewed this educational content on 6/1/2018 2000-2021 [employer]. All rights reserved. This information is not intended as a substitute for professional medical care. Always follow your healthcare professional's instructions.          When to Use Antibiotics    Antibiotics are medicines used to treat infections caused by bacteria. They don t work for an illness caused by a virus. And they don't work for an allergic reaction. In fact, taking antibiotics for reasons other than an infection by bacteria can cause problems. You may have side effects from the medicine. And if you need an antibiotic in the future, it may not work well. This is because the bacteria can become immune to  the medicine. You can also get a type of diarrhea that's hard to treat. This diarrhea is called C. diff.   When antibiotics likely won t help  Your healthcare provider won t usually give you antibiotics for the conditions listed below. You can help by not asking for them if you have:     A cold. This type of illness is caused by a virus. It can cause a runny nose, stuffed-up nose, sneezing, coughing, and headache. You may also have mild body aches and low fever. A cold gets better on its own in a few days to a week.    The flu (influenza). This is a respiratory illness caused by a virus. The flu usually goes away on its own in a week or so. It can cause fever, body aches, sore throat, and tiredness.    Bronchitis. This is an infection in the lungs. It is most often caused by a virus. You may have coughing, phlegm, body aches, and a low fever. A common type of bronchitis is known as a chest cold. This is called acute bronchitis. This often happens after you have a respiratory infection like a cold. Bronchitis can take weeks to go away. Antibiotics often don t help.    Most sore throats. Sore throats are most often caused by viruses. Your throat may feel scratchy or achy. It may hurt to swallow. You may also have a low fever and body aches. A sore throat usually gets better in a few days.    Most outer ear infections. An ear infection may be caused by a virus or bacteria. It causes pain in the ear. Antibiotics by mouth usually don t help. Low-dose antibiotic ear drops work much better.    Some inner ear infections. An inner ear infection (otitis media) can be caused by a virus in the ear. It can also cause pain and a high fever. Most older children with low-grade fever don't need to be treated with antibiotics.    Most sinus infections. This is also known as sinusitis. This kind of infection causes sinus pain and swelling, and a runny nose. In most cases, it goes away on its own. Antibiotics don t make recovery  quicker.    Allergic rhinitis. This is a set of symptoms caused by an allergic reaction. You may have sneezing, a runny nose, itchy or watery eyes, or a sore throat. Allergies are not treated with antibiotics.    Low fever. A mild fever that s less than 100.4 F (38 C) most likely doesn t need to be treated with antibiotics.   When antibiotics can help  Antibiotics can be used to treat:                                                       Strep throat. This is a throat infection caused by a certain type of bacteria. Symptoms of strep throat include a sore throat, white patches on the tonsils, red spots on the roof of the mouth, fever, body aches, and nausea and vomiting. Strep throat almost never causes a cough.    Urinary tract infection (UTI). This is an infection of the bladder and the tube that takes urine out of the body. It is caused by bacteria. It can cause burning pain and urine that s cloudy or tinted with blood. UTIs are very common. Antibiotics usually help treat them.    Some outer ear infections. In some cases, a healthcare provider may prescribe antibiotics by mouth for an ear infection. You may need a test to show the cause of the ear infection.    Some sinus infections. In some cases, your healthcare provider may give you antibiotics. He or she may first need to make sure your symptoms aren t caused by something else. This may be a virus, fungus, allergies, or air pollutants such as smoke.   Your healthcare provider may give you antibiotics if you have a condition that can affect your immune system. This includes diabetes or cancer.  Self-care at home  If your infection can t be treated with antibiotics, you can take other steps to feel better. Try the remedies below. In general:     Rest and sleep as much as needed.    Drink water and other clear fluids.    Don t smoke. Stay away from smoke from other people.    Use over-the-counter medicine such as acetaminophen or ibuprofen to ease pain or fever,  as directed by your healthcare provider.  To treat sinus pain or nasal stuffiness:    Put a warm, moist cloth on your face where you feel sinus pain or pressure.    Try a nasal spray with medicine or saline. Use as directed by your healthcare provider.    Breathe in steam from a hot shower.    Use a humidifier or cool mist vaporizer.   To quiet a cough:     Use a humidifier or cool mist vaporizer.    Breathe in steam from a hot shower.    Suck on cough lozenges.   To sooth a sore throat:     Suck on ice chips, frozen ice pops, or lozenges.    Use a sore throat spray.    Use a humidifier or cool mist vaporizer.    Gargle with saltwater.    Drink warm liquids.    Take ibuprofen to reduce swelling and pain.  To ease ear pain:     Hold a warm, moist washcloth on the ear for 10 minutes at a time.  [email or web address] last reviewed this educational content on 12/1/2019 2000-2021 [employer]. All rights reserved. This information is not intended as a substitute for professional medical care. Always follow your healthcare professional's instructions.        Dear Janelle Alston    After reviewing your responses, I've been able to diagnose you with?a sinus infection caused by a virus.?     Based on your responses and diagnosis, I have prescribed tessalon  to treat your symptoms. I have sent this to your pharmacy.?     It is also important to stay well hydrated, get lots of rest and take over-the-counter decongestants,?tylenol?or ibuprofen if you?are able to?take those medications per your primary care provider to help relieve discomfort.?     It is important that you take?all of?your prescribed medication even if your symptoms are improving after a few doses.? Taking?all of?your medicine helps prevent the symptoms from returning.?     If your symptoms worsen, you develop severe headache, vomiting, high fever (>102), or are not improving in 7 days, please contact your primary care provider for an appointment or visit any  of our convenient Walk-in Care or Urgent Care Centers to be seen which can be found on our website?here.?     Thanks again for choosing?us?as your health care partner,?   ?  Maite Ferguson MD?

## 2021-12-15 PROBLEM — R10.2 PELVIC PAIN IN FEMALE: Status: RESOLVED | Noted: 2017-12-13 | Resolved: 2021-12-15

## 2022-01-25 ENCOUNTER — OFFICE VISIT (OUTPATIENT)
Dept: ENDOCRINOLOGY | Facility: CLINIC | Age: 47
End: 2022-01-25
Attending: OBSTETRICS & GYNECOLOGY
Payer: COMMERCIAL

## 2022-01-25 VITALS — HEART RATE: 57 BPM | SYSTOLIC BLOOD PRESSURE: 99 MMHG | DIASTOLIC BLOOD PRESSURE: 59 MMHG

## 2022-01-25 DIAGNOSIS — E04.2 NONTOXIC MULTINODULAR GOITER: Primary | ICD-10-CM

## 2022-01-25 PROCEDURE — 99204 OFFICE O/P NEW MOD 45 MIN: CPT | Performed by: INTERNAL MEDICINE

## 2022-01-25 RX ORDER — LEVOTHYROXINE, LIOTHYRONINE 38; 9 UG/1; UG/1
60 TABLET ORAL
COMMUNITY
Start: 2022-01-21

## 2022-01-25 NOTE — LETTER
1/25/2022         RE: Janelle Alston  [id number] [facility] [id number]        Dear Colleague,    Thank you for referring your patient, Janelle Alston, to the [facility] [place]. Please see a copy of my visit note below.    Name: Janelle Alston is a 46 year old woamn, seen at the request of Dr. Jay Lovelace for evaluation of     Chief Complaint   Patient presents with     Thyroid Problem     nodule        HPI:  Recent issues:  Here for evaluation of thyroid nodule problem  Reviewed medical history from patient and Epic chart record        Patient recalls being told of an enlarged thyroid gland for many years at her OBGYN evaluations  8/23/21. Medical evaluation with Dr. Jay Lovelace/[facility]  Thyroid gland enlarged on exam, thyroid imaging ordered    8/31/21 Thyroid U/S:   Right lobe 5.7 x 2.0 x 1.9 cm and left lobe 5.5 x 1.9 x 1.8 cm. :   1. RLL 1.4 x 0.9 x 0.9 cm solid nodule.    2. DELFINO 0.5 x 0.6 x 0.4 cm cystic nodule.   3. LLL 1.3 x 1.2 x 1.0 cm mixed cystic and solid nodule    12/2021. Medical evaluation at OBGYN clinic  Has seen an integrative medicine healthcare provider (Candy ___?) in [facility]  Patient recalls lab testing that showed low T3 level, other thyroid hormone levels normal.     Lab results, records not available here  Symptoms of weight gain, perceived low metabolism, despite good nutrition and exercise, moodiness/depressed, brain fog  Started thyroid medication as NP-Thyroid 60 mg daily   Patient feeling better on thyroid medication    Previous [facility] thyroid tests include:     Lab Test 08/23/21  1223 07/22/19  1122   TSH 1.31 1.02   T4 0.98  --    TPO <10  --      Additional health history:  Neck radiation treatment: none  Thyroid med use:  yes  Fam Hx thyroid disease: 2 cousins    Recent [facility] labs include:  Lab Results   Component Value Date    TSH 1.31 08/23/2021    T4 0.98 08/23/2021    TPO <10 08/23/2021         Lives in  [place], [place]  Sees Ramona Aaseby-Aguilera PAC/[facility] for general medicine evaluations.    PMH/PSH:  Past Medical History:   Diagnosis Date     Abdominal pain      Allergic rhinitis, cause unspecified      Asthma      Pelvic floor dysfunction 2018    Seeing pelvic floor physical therapist      Past Surgical History:   Procedure Laterality Date     ABDOMINOPLASTY       DILATION AND CURETTAGE, HYSTEROSCOPY, ABLATE ENDOMETRIUM NOVASURE, COMBINED N/A 2016    Procedure: COMBINED DILATION AND CURETTAGE, HYSTEROSCOPY, ABLATE ENDOMETRIUM NOVASURE;  Surgeon: Isak Eaton MD;  Location: [facility] OR     HC TOOTH EXTRACTION W/FORCEP       SURGICAL HISTORY OF -       laproscopy -abd-for endometriosis     ZZC  DELIVERY ONLY       and        Family Hx:  Family History   Problem Relation Age of Onset     Family History Negative Mother      Family History Negative Father      Diabetes Other         paternal greatgrandmother     Family History Negative Brother         1     Cancer Maternal Grandmother         ? kidney cancer         Social Hx:  Social History     Socioeconomic History     Marital status:      Spouse name: Not on file     Number of children: Not on file     Years of education: Not on file     Highest education level: Not on file   Occupational History     Occupation: aerobic instructor     Employer: [employer]   Tobacco Use     Smoking status: Never Smoker     Smokeless tobacco: Never Used   Vaping Use     Vaping Use: Never used   Substance and Sexual Activity     Alcohol use: No     Drug use: No     Sexual activity: Yes     Partners: Male     Birth control/protection: Condom, Surgical     Comment:  had vasectomy   Other Topics Concern     Parent/sibling w/ CABG, MI or angioplasty before 65F 55M? No   Social History Narrative     Not on file     Social Determinants of Health     Financial Resource Strain: Not on file   Food Insecurity: Not on file    Transportation Needs: Not on file   Physical Activity: Not on file   Stress: Not on file   Social Connections: Not on file   Intimate Partner Violence: Not on file   Housing Stability: Not on file          MEDICATIONS:  has a current medication list which includes the following prescription(s): acidophilus, albuterol, b complex vitamins, docosahexaenoic acid, fexofenadine, flaxseed oil, fluticasone, magnesium, np thyroid, omega 3, pregnenolone micronized, vitamin c, vitamin d3, benzonatate, and epinephrine.    ROS:     ROS: 10 point ROS neg other than the symptoms noted above in the HPI.    GENERAL: some fatigue, wt gain; denies fevers, chills, night sweats.   HEENT: no dysphagia, odonophagia, diplopia, neck pain  THYROID:  no apparent hyper or hypothyroid symptoms  CV: no chest pain, pressure, palpitations  LUNGS: no SOB, ALVAREZ, cough, wheezing   ABDOMEN: no diarrhea, constipation, abdominal pain  EXTREMITIES: no rashes, ulcers, edema  NEUROLOGY: no headaches, denies changes in vision, tingling, extremitiy numbness   MSK: no muscle aches or pains, weakness  SKIN: no rashes or lesions  : no menses since previous uterine ablation  PSYCH:  some depression but recent stable mood, no significant anxiety   ENDOCRINE: no heat or cold intolerance    Physical Exam   VS: BP 99/59   Pulse 57  limited vital info available  GENERAL: AXOX3, NAD, well dressed, answering questions appropriately, appears stated age.  THYROID:  thyroid mildly enlarged, size estimate 25 gm; no apparent nodules or goiter  HEENT: neck non-tender, no exopthalmous, no proptosis, EOMI  CV: RRR, no rubs, gallops, no murmurs  LUNGS: CTAB, no wheezes, rales, or ronchi  ABDOMEN: soft, nontender, nondistended  EXTREMITIES: no edema, tremors  NEUROLOGY: CN grossly intact, no tremors  MSK: grossly intact  SKIN: no rashes, no lesions    LABS:    All pertinent notes, labs, and images personally reviewed by me.     A/P:  Encounter Diagnosis   Name Primary?      Nontoxic multinodular goiter Yes       Comments:  Reviewed health history and thyroid nodule issues.  Thyroid gland mildly enlarged on exam, but thyroid nodules not palpable  [facility] thyroid [facility] unclear    Plan:  Discussed general issues with the thyroid nodule diagnosis and management  Reviewed the normal thyroid gland anatomy and hormone physiology  We reviewed highlights of the patient's previous thyroid ultrasound information available  Discussed lab tests used to assess patient thyroid hormone levels  Reviewed the thyroid nodule fine needle aspiration (FNA) biopsy option    Recommend:  Continue observation plan for the thyroid nodule management  Monitor for neck symptoms  Discussed thyroid U/S imaging and option of dominant (solid) thyroid nodule FNA  Plan repeat thyroid U/S in ~7/2022   Procedure order placed  Offered caution with use of thyroid medication if no evidence for hypothyroidism   Would not use thyroid medication for treatment of a low T3, normal TSH and FT4 condition   Her NP-Thyroid use can cause hyperthyroidism and associated symptoms  She will follow-up with her [facility] provider regarding the thyroid med use plan    Addressed patient questions today     There are no Patient Instructions on file for this visit.    Future labs ordered today:   Orders Placed This Encounter   Procedures     US Thyroid     Radiology/Consults ordered today:     Total time spent in with the patient evaluation:  20 min  Additional time spent reviewing pertinent lab tests and chart notes, and documentation:  25 min    Follow-up:  dilma Hernandez MD, MS  Endocrinology  [facility]    CC: MARC Lovelace, Aaseby-Aguilera, R       Again, thank you for allowing me to participate in the care of your patient.        Sincerely,        Jay Hernandez MD

## 2022-07-05 ENCOUNTER — OFFICE VISIT (OUTPATIENT)
Dept: FAMILY MEDICINE | Facility: CLINIC | Age: 47
End: 2022-07-05
Payer: COMMERCIAL

## 2022-07-05 VITALS
OXYGEN SATURATION: 99 % | BODY MASS INDEX: 20.66 KG/M2 | DIASTOLIC BLOOD PRESSURE: 74 MMHG | TEMPERATURE: 97.6 F | HEART RATE: 56 BPM | SYSTOLIC BLOOD PRESSURE: 112 MMHG | HEIGHT: 66 IN | RESPIRATION RATE: 16 BRPM

## 2022-07-05 DIAGNOSIS — J45.20 MILD INTERMITTENT ASTHMA WITHOUT COMPLICATION: ICD-10-CM

## 2022-07-05 DIAGNOSIS — Z91.030 HX OF BEE STING ALLERGY: ICD-10-CM

## 2022-07-05 DIAGNOSIS — R59.1 LYMPHADENOPATHY: Primary | ICD-10-CM

## 2022-07-05 DIAGNOSIS — Z11.4 SCREENING FOR HIV (HUMAN IMMUNODEFICIENCY VIRUS): ICD-10-CM

## 2022-07-05 DIAGNOSIS — Z12.11 SCREEN FOR COLON CANCER: ICD-10-CM

## 2022-07-05 DIAGNOSIS — Z11.59 NEED FOR HEPATITIS C SCREENING TEST: ICD-10-CM

## 2022-07-05 LAB
BASOPHILS # BLD AUTO: 0 10E3/UL (ref 0–0.2)
BASOPHILS NFR BLD AUTO: 0 %
EOSINOPHIL # BLD AUTO: 0.2 10E3/UL (ref 0–0.7)
EOSINOPHIL NFR BLD AUTO: 2 %
ERYTHROCYTE [DISTWIDTH] IN BLOOD BY AUTOMATED COUNT: 12.9 % (ref 10–15)
HCT VFR BLD AUTO: 40.3 % (ref 35–47)
HGB BLD-MCNC: 13.7 G/DL (ref 11.7–15.7)
LYMPHOCYTES # BLD AUTO: 2.2 10E3/UL (ref 0.8–5.3)
LYMPHOCYTES NFR BLD AUTO: 32 %
MCH RBC QN AUTO: 30.4 PG (ref 26.5–33)
MCHC RBC AUTO-ENTMCNC: 34 G/DL (ref 31.5–36.5)
MCV RBC AUTO: 90 FL (ref 78–100)
MONOCYTES # BLD AUTO: 0.5 10E3/UL (ref 0–1.3)
MONOCYTES NFR BLD AUTO: 7 %
MONOCYTES NFR BLD AUTO: NEGATIVE %
NEUTROPHILS # BLD AUTO: 4.1 10E3/UL (ref 1.6–8.3)
NEUTROPHILS NFR BLD AUTO: 59 %
PLATELET # BLD AUTO: 243 10E3/UL (ref 150–450)
RBC # BLD AUTO: 4.5 10E6/UL (ref 3.8–5.2)
WBC # BLD AUTO: 7 10E3/UL (ref 4–11)

## 2022-07-05 PROCEDURE — 36415 COLL VENOUS BLD VENIPUNCTURE: CPT | Performed by: PHYSICIAN ASSISTANT

## 2022-07-05 PROCEDURE — 85045 AUTOMATED RETICULOCYTE COUNT: CPT | Performed by: PHYSICIAN ASSISTANT

## 2022-07-05 PROCEDURE — 85025 COMPLETE CBC W/AUTO DIFF WBC: CPT | Performed by: PHYSICIAN ASSISTANT

## 2022-07-05 PROCEDURE — 99213 OFFICE O/P EST LOW 20 MIN: CPT | Performed by: PHYSICIAN ASSISTANT

## 2022-07-05 PROCEDURE — 86308 HETEROPHILE ANTIBODY SCREEN: CPT | Performed by: PHYSICIAN ASSISTANT

## 2022-07-05 RX ORDER — ALBUTEROL SULFATE 90 UG/1
1-2 AEROSOL, METERED RESPIRATORY (INHALATION) EVERY 4 HOURS PRN
Qty: 18 G | Refills: 1 | Status: SHIPPED | OUTPATIENT
Start: 2022-07-05

## 2022-07-05 RX ORDER — EPINEPHRINE 0.3 MG/.3ML
0.3 INJECTION SUBCUTANEOUS
Qty: 0.6 ML | Refills: 1 | Status: SHIPPED | OUTPATIENT
Start: 2022-07-05

## 2022-07-05 RX ORDER — LEVOTHYROXINE, LIOTHYRONINE 57; 13.5 UG/1; UG/1
TABLET ORAL
COMMUNITY
Start: 2022-05-20

## 2022-07-05 NOTE — PROGRESS NOTES
"  Assessment & Plan       Mild intermittent asthma without complication  Stable pt request  - albuterol (PROAIR HFA/PROVENTIL HFA/VENTOLIN HFA) 108 (90 Base) MCG/ACT inhaler; Inhale 1-2 puffs into the lungs every 4 hours as needed (Cough, Wheezing, or Shortness of Breath)    Hx of bee sting allergy  Pt request  - EPINEPHrine (EPIPEN 2-ANNEMARIE) 0.3 MG/0.3ML injection 2-pack; Inject 0.3 mLs (0.3 mg) into the muscle once as needed for anaphylaxis    Lymphadenopathy  Not concerned but well get cbc and morphology.  Will contact patient with results when known and determine plan  - Lab Blood Morphology Pathologist Review  - Mononucleosis screen    [id number]}         Return in about 4 weeks (around 8/2/2022) for Physical Exam.    Ramona Ann Aaseby-Aguilera, PA-C  [facility] 47 year old, presenting for the following health issues:  Lump in neck  Has noticed for months .  On right side of upper neck.   Not sore.  Can feel more prominatly if she pushed from other side. No fevers, ST or other symptoms.   History of Present Illness       Reason for visit:  Lump in neck  Symptom onset:  More than a month  Symptoms include:  Lump  Symptom intensity:  Mild  Symptom progression:  Staying the same  Had these symptoms before:  No    She eats 4 or more servings of fruits and vegetables daily.She consumes 0 sweetened beverage(s) daily.She exercises with enough effort to increase her heart rate 60 or more minutes per day.  She exercises with enough effort to increase her heart rate 4 days per week.   She is taking medications regularly.           Review of Systems   Constitutional, HEENT, cardiovascular, pulmonary, gi and gu systems are negative, except as otherwise noted.      Objective    /74   Pulse 56   Temp 97.6  F (36.4  C) (Oral)   Resp 16   Ht 1.676 m (5' 6\")   SpO2 99%   BMI 20.66 kg/m    Body mass index is 20.66 kg/m .  Physical Exam   GENERAL: healthy, alert and " no distress  EYES: Eyes grossly normal to inspection, PERRL and conjunctivae and sclerae normal  HENT: ear canals and TM's normal, nose and mouth without ulcers or lesions  NECK: no asymmetry, masses, or scars, thyroid normal to palpation and 1 cm subtle mass under jaw line  RESP: lungs clear to auscultation - no rales, rhonchi or wheezes                  .  ..

## 2022-07-06 ENCOUNTER — MYC MEDICAL ADVICE (OUTPATIENT)
Dept: FAMILY MEDICINE | Facility: CLINIC | Age: 47
End: 2022-07-06

## 2022-07-06 LAB
RETICS # AUTO: 0.08 10E6/UL (ref 0.03–0.1)
RETICS/RBC NFR AUTO: 1.8 % (ref 0.5–2)

## 2022-07-06 NOTE — TELEPHONE ENCOUNTER
This message was given to Dorothy Morgan.  Dorothy stated that she will call the pt and address the concerns.-Jocelyn Smith, [facility]

## 2022-07-07 LAB
PATH REPORT.COMMENTS IMP SPEC: NORMAL
PATH REPORT.FINAL DX SPEC: NORMAL
PATH REPORT.MICROSCOPIC SPEC OTHER STN: NORMAL
PATH REPORT.MICROSCOPIC SPEC OTHER STN: NORMAL

## 2022-11-19 ENCOUNTER — HEALTH MAINTENANCE LETTER (OUTPATIENT)
Age: 47
End: 2022-11-19

## 2023-10-30 ENCOUNTER — APPOINTMENT (OUTPATIENT)
Dept: URBAN - METROPOLITAN AREA CLINIC 253 | Age: 48
Setting detail: DERMATOLOGY
End: 2023-11-01

## 2023-10-30 VITALS — HEIGHT: 66 IN | RESPIRATION RATE: 14 BRPM | WEIGHT: 135 LBS

## 2023-10-30 DIAGNOSIS — L21.8 OTHER SEBORRHEIC DERMATITIS: ICD-10-CM

## 2023-10-30 DIAGNOSIS — L65.0 TELOGEN EFFLUVIUM: ICD-10-CM

## 2023-10-30 PROBLEM — L65.9 NONSCARRING HAIR LOSS, UNSPECIFIED: Status: ACTIVE | Noted: 2023-10-30

## 2023-10-30 PROCEDURE — 99202 OFFICE O/P NEW SF 15 MIN: CPT

## 2023-10-30 PROCEDURE — OTHER ADDITIONAL NOTES: OTHER

## 2023-10-30 PROCEDURE — OTHER COUNSELING: OTHER

## 2023-10-30 PROCEDURE — OTHER MIPS QUALITY: OTHER

## 2023-10-30 ASSESSMENT — LOCATION SIMPLE DESCRIPTION DERM: LOCATION SIMPLE: HAIR

## 2023-10-30 ASSESSMENT — LOCATION ZONE DERM: LOCATION ZONE: SCALP

## 2023-10-30 ASSESSMENT — LOCATION DETAILED DESCRIPTION DERM: LOCATION DETAILED: HAIR

## 2023-10-30 NOTE — PROCEDURE: ADDITIONAL NOTES
Additional Notes: Punch Biopsy - declined.  Discussed spironolactone as an option for 2 months to assess if it decreases the amount of hairloss- declined)\\nRecommended Dr. Em Castillo at the [facility] if she desires magnified photos. \\nDiscussed a punch biopsy for further workup to asses telogen effluvium vs androgenic alopecia. \\n\\nDiscussed PRP treatment as an option that may aid in boosting new growth. \\n\\nMost recent Blood work collected September 19, 2023. \\nIron levels high, ferritin normal. \\nHistory of taking medication for her TSH and is currently in the process of discontinuing. \\n\\nNo patchy loss/ alopecia areata present. Additional Notes: Punch Biopsy - declined.  Discussed spironolactone as an option for 2 months to assess if it decreases the amount of hairloss- declined)\\nRecommended Dr. Em Castillo at the [facility] if she desires magnified photos. \\nDiscussed a punch biopsy for further workup to asses telogen effluvium vs androgenic alopecia. \\n\\nDiscussed PRP treatment as an option that may aid in boosting new growth. \\n\\nMost recent Blood work collected September 19, 2023. \\nIron levels high, ferritin normal. \\nHistory of taking medication for her TSH and is currently in the process of discontinuing. \\n\\nNo patchy loss/ alopecia areata present.

## 2023-10-30 NOTE — HPI: HAIR LOSS
Additional History: Patient states she was diagnosed with TE 9 months ago. Patient washes her hair twice a week. She only uses organic products.

## 2024-01-28 ENCOUNTER — HEALTH MAINTENANCE LETTER (OUTPATIENT)
Age: 49
End: 2024-01-28

## 2025-02-01 ENCOUNTER — HEALTH MAINTENANCE LETTER (OUTPATIENT)
Age: 50
End: 2025-02-01

## 2025-08-25 ENCOUNTER — THERAPY VISIT (OUTPATIENT)
Dept: PHYSICAL THERAPY | Facility: CLINIC | Age: 50
End: 2025-08-25
Payer: COMMERCIAL

## 2025-08-25 DIAGNOSIS — M62.89 PELVIC FLOOR DYSFUNCTION IN FEMALE: Primary | ICD-10-CM

## 2025-08-25 PROCEDURE — 97530 THERAPEUTIC ACTIVITIES: CPT | Mod: GP | Performed by: PHYSICAL THERAPIST

## 2025-08-25 PROCEDURE — 97110 THERAPEUTIC EXERCISES: CPT | Mod: GP | Performed by: PHYSICAL THERAPIST

## 2025-08-25 PROCEDURE — 97161 PT EVAL LOW COMPLEX 20 MIN: CPT | Mod: GP | Performed by: PHYSICAL THERAPIST
